# Patient Record
Sex: FEMALE | NOT HISPANIC OR LATINO | Employment: FULL TIME | URBAN - METROPOLITAN AREA
[De-identification: names, ages, dates, MRNs, and addresses within clinical notes are randomized per-mention and may not be internally consistent; named-entity substitution may affect disease eponyms.]

---

## 2017-02-17 ENCOUNTER — HOSPITAL ENCOUNTER (OUTPATIENT)
Dept: MAMMOGRAPHY | Facility: CLINIC | Age: 52
Discharge: HOME | End: 2017-02-17
Attending: FAMILY MEDICINE | Admitting: FAMILY MEDICINE
Payer: COMMERCIAL

## 2017-02-17 DIAGNOSIS — Z12.31 VISIT FOR SCREENING MAMMOGRAM: ICD-10-CM

## 2017-02-17 PROCEDURE — 77063 BREAST TOMOSYNTHESIS BI: CPT

## 2017-02-17 PROCEDURE — G0202 SCR MAMMO BI INCL CAD: HCPCS

## 2017-02-23 ENCOUNTER — HOSPITAL ENCOUNTER (OUTPATIENT)
Dept: MAMMOGRAPHY | Facility: CLINIC | Age: 52
Discharge: HOME | End: 2017-02-23
Attending: FAMILY MEDICINE | Admitting: FAMILY MEDICINE
Payer: COMMERCIAL

## 2017-02-23 DIAGNOSIS — R92.8 ABNORMAL MAMMOGRAM: ICD-10-CM

## 2017-02-23 PROCEDURE — 76642 ULTRASOUND BREAST LIMITED: CPT | Mod: LT

## 2017-08-12 ENCOUNTER — HEALTH MAINTENANCE LETTER (OUTPATIENT)
Age: 52
End: 2017-08-12

## 2017-08-31 ENCOUNTER — HOSPITAL ENCOUNTER (OUTPATIENT)
Dept: MAMMOGRAPHY | Facility: CLINIC | Age: 52
Discharge: HOME | End: 2017-08-31
Attending: FAMILY MEDICINE | Admitting: FAMILY MEDICINE
Payer: COMMERCIAL

## 2017-08-31 DIAGNOSIS — Z09 FOLLOW-UP EXAM, 3-6 MONTHS SINCE PREVIOUS EXAM: ICD-10-CM

## 2017-08-31 PROCEDURE — G0279 TOMOSYNTHESIS, MAMMO: HCPCS

## 2018-04-13 ENCOUNTER — HOSPITAL ENCOUNTER (OUTPATIENT)
Dept: MAMMOGRAPHY | Facility: CLINIC | Age: 53
Discharge: HOME | End: 2018-04-13
Attending: FAMILY MEDICINE | Admitting: FAMILY MEDICINE
Payer: COMMERCIAL

## 2018-04-13 DIAGNOSIS — Z12.31 VISIT FOR SCREENING MAMMOGRAM: ICD-10-CM

## 2018-04-13 PROCEDURE — 77063 BREAST TOMOSYNTHESIS BI: CPT

## 2018-07-29 ENCOUNTER — APPOINTMENT (OUTPATIENT)
Dept: ULTRASOUND IMAGING | Facility: CLINIC | Age: 53
DRG: 301 | End: 2018-07-29
Attending: EMERGENCY MEDICINE
Payer: COMMERCIAL

## 2018-07-29 ENCOUNTER — HOSPITAL ENCOUNTER (INPATIENT)
Facility: CLINIC | Age: 53
LOS: 2 days | Discharge: HOME | DRG: 301 | End: 2018-07-31
Attending: EMERGENCY MEDICINE | Admitting: SURGERY
Payer: COMMERCIAL

## 2018-07-29 DIAGNOSIS — M79.605 PAIN OF LEFT LOWER EXTREMITY: ICD-10-CM

## 2018-07-29 DIAGNOSIS — I82.4Z2 DVT, LOWER EXTREMITY, DISTAL, ACUTE, LEFT (H): Primary | ICD-10-CM

## 2018-07-29 DIAGNOSIS — I82.409 DEEP VEIN THROMBOSIS (DVT) (H): ICD-10-CM

## 2018-07-29 LAB
ANION GAP SERPL CALCULATED.3IONS-SCNC: 10 MMOL/L (ref 3–14)
BASOPHILS # BLD AUTO: 0 10E9/L (ref 0–0.2)
BASOPHILS NFR BLD AUTO: 0.3 %
BUN SERPL-MCNC: 20 MG/DL (ref 7–30)
CALCIUM SERPL-MCNC: 8.6 MG/DL (ref 8.5–10.1)
CHLORIDE SERPL-SCNC: 105 MMOL/L (ref 94–109)
CO2 SERPL-SCNC: 25 MMOL/L (ref 20–32)
CREAT SERPL-MCNC: 0.7 MG/DL (ref 0.52–1.04)
DIFFERENTIAL METHOD BLD: ABNORMAL
EOSINOPHIL # BLD AUTO: 0.2 10E9/L (ref 0–0.7)
EOSINOPHIL NFR BLD AUTO: 1.2 %
ERYTHROCYTE [DISTWIDTH] IN BLOOD BY AUTOMATED COUNT: 14 % (ref 10–15)
GFR SERPL CREATININE-BSD FRML MDRD: 88 ML/MIN/1.7M2
GLUCOSE SERPL-MCNC: 187 MG/DL (ref 70–99)
HCT VFR BLD AUTO: 40.3 % (ref 35–47)
HGB BLD-MCNC: 13.5 G/DL (ref 11.7–15.7)
IMM GRANULOCYTES # BLD: 0 10E9/L (ref 0–0.4)
IMM GRANULOCYTES NFR BLD: 0.3 %
LYMPHOCYTES # BLD AUTO: 1.9 10E9/L (ref 0.8–5.3)
LYMPHOCYTES NFR BLD AUTO: 15 %
MCH RBC QN AUTO: 29 PG (ref 26.5–33)
MCHC RBC AUTO-ENTMCNC: 33.5 G/DL (ref 31.5–36.5)
MCV RBC AUTO: 87 FL (ref 78–100)
MONOCYTES # BLD AUTO: 0.6 10E9/L (ref 0–1.3)
MONOCYTES NFR BLD AUTO: 4.7 %
NEUTROPHILS # BLD AUTO: 9.7 10E9/L (ref 1.6–8.3)
NEUTROPHILS NFR BLD AUTO: 78.5 %
NRBC # BLD AUTO: 0 10*3/UL
NRBC BLD AUTO-RTO: 0 /100
PLATELET # BLD AUTO: 298 10E9/L (ref 150–450)
POTASSIUM SERPL-SCNC: 3.4 MMOL/L (ref 3.4–5.3)
RBC # BLD AUTO: 4.65 10E12/L (ref 3.8–5.2)
SODIUM SERPL-SCNC: 140 MMOL/L (ref 133–144)
WBC # BLD AUTO: 12.3 10E9/L (ref 4–11)

## 2018-07-29 PROCEDURE — 96366 THER/PROPH/DIAG IV INF ADDON: CPT

## 2018-07-29 PROCEDURE — 80048 BASIC METABOLIC PNL TOTAL CA: CPT | Performed by: EMERGENCY MEDICINE

## 2018-07-29 PROCEDURE — 25000128 H RX IP 250 OP 636

## 2018-07-29 PROCEDURE — 96365 THER/PROPH/DIAG IV INF INIT: CPT

## 2018-07-29 PROCEDURE — 99285 EMERGENCY DEPT VISIT HI MDM: CPT | Mod: 25

## 2018-07-29 PROCEDURE — 83036 HEMOGLOBIN GLYCOSYLATED A1C: CPT | Performed by: EMERGENCY MEDICINE

## 2018-07-29 PROCEDURE — 96376 TX/PRO/DX INJ SAME DRUG ADON: CPT

## 2018-07-29 PROCEDURE — 85025 COMPLETE CBC W/AUTO DIFF WBC: CPT | Performed by: EMERGENCY MEDICINE

## 2018-07-29 PROCEDURE — 25000132 ZZH RX MED GY IP 250 OP 250 PS 637: Performed by: STUDENT IN AN ORGANIZED HEALTH CARE EDUCATION/TRAINING PROGRAM

## 2018-07-29 PROCEDURE — 93971 EXTREMITY STUDY: CPT | Mod: LT

## 2018-07-29 PROCEDURE — 25000128 H RX IP 250 OP 636: Performed by: STUDENT IN AN ORGANIZED HEALTH CARE EDUCATION/TRAINING PROGRAM

## 2018-07-29 PROCEDURE — 12000007 ZZH R&B INTERMEDIATE

## 2018-07-29 RX ORDER — TRAMADOL HYDROCHLORIDE 50 MG/1
50-100 TABLET ORAL EVERY 6 HOURS PRN
Status: DISCONTINUED | OUTPATIENT
Start: 2018-07-29 | End: 2018-07-31 | Stop reason: HOSPADM

## 2018-07-29 RX ORDER — LIDOCAINE 40 MG/G
CREAM TOPICAL
Status: DISCONTINUED | OUTPATIENT
Start: 2018-07-29 | End: 2018-07-31 | Stop reason: HOSPADM

## 2018-07-29 RX ORDER — AMOXICILLIN 250 MG
2 CAPSULE ORAL DAILY PRN
COMMUNITY
End: 2018-08-15

## 2018-07-29 RX ORDER — ACETAMINOPHEN 325 MG/1
975 TABLET ORAL EVERY 8 HOURS
Status: DISCONTINUED | OUTPATIENT
Start: 2018-07-29 | End: 2018-07-31 | Stop reason: HOSPADM

## 2018-07-29 RX ORDER — HYDRALAZINE HYDROCHLORIDE 20 MG/ML
10 INJECTION INTRAMUSCULAR; INTRAVENOUS EVERY 4 HOURS PRN
Status: DISCONTINUED | OUTPATIENT
Start: 2018-07-29 | End: 2018-07-31 | Stop reason: HOSPADM

## 2018-07-29 RX ORDER — NALOXONE HYDROCHLORIDE 0.4 MG/ML
.1-.4 INJECTION, SOLUTION INTRAMUSCULAR; INTRAVENOUS; SUBCUTANEOUS
Status: DISCONTINUED | OUTPATIENT
Start: 2018-07-29 | End: 2018-07-31 | Stop reason: HOSPADM

## 2018-07-29 RX ORDER — METHOCARBAMOL 750 MG/1
750 TABLET, FILM COATED ORAL 4 TIMES DAILY PRN
Status: DISCONTINUED | OUTPATIENT
Start: 2018-07-29 | End: 2018-07-31 | Stop reason: HOSPADM

## 2018-07-29 RX ORDER — ONDANSETRON 2 MG/ML
4 INJECTION INTRAMUSCULAR; INTRAVENOUS EVERY 6 HOURS PRN
Status: DISCONTINUED | OUTPATIENT
Start: 2018-07-29 | End: 2018-07-31 | Stop reason: HOSPADM

## 2018-07-29 RX ORDER — ONDANSETRON 4 MG/1
4 TABLET, ORALLY DISINTEGRATING ORAL EVERY 6 HOURS PRN
Status: DISCONTINUED | OUTPATIENT
Start: 2018-07-29 | End: 2018-07-31 | Stop reason: HOSPADM

## 2018-07-29 RX ORDER — ANTIARTHRITIC COMBINATION NO.2 900 MG
12.5 TABLET ORAL DAILY
Status: ON HOLD | COMMUNITY
End: 2018-07-31

## 2018-07-29 RX ORDER — ACETAMINOPHEN 325 MG/1
650 TABLET ORAL EVERY 4 HOURS PRN
Status: DISCONTINUED | OUTPATIENT
Start: 2018-08-01 | End: 2018-07-31 | Stop reason: HOSPADM

## 2018-07-29 RX ORDER — LABETALOL HYDROCHLORIDE 5 MG/ML
10 INJECTION, SOLUTION INTRAVENOUS EVERY 4 HOURS PRN
Status: DISCONTINUED | OUTPATIENT
Start: 2018-07-29 | End: 2018-07-31 | Stop reason: HOSPADM

## 2018-07-29 RX ORDER — DIPHENHYDRAMINE HCL 25 MG
25 CAPSULE ORAL EVERY 6 HOURS PRN
Status: DISCONTINUED | OUTPATIENT
Start: 2018-07-29 | End: 2018-07-31 | Stop reason: HOSPADM

## 2018-07-29 RX ORDER — DIPHENHYDRAMINE HYDROCHLORIDE 50 MG/ML
25 INJECTION INTRAMUSCULAR; INTRAVENOUS EVERY 6 HOURS PRN
Status: DISCONTINUED | OUTPATIENT
Start: 2018-07-29 | End: 2018-07-31 | Stop reason: HOSPADM

## 2018-07-29 RX ORDER — SODIUM CHLORIDE 9 MG/ML
INJECTION, SOLUTION INTRAVENOUS CONTINUOUS
Status: DISCONTINUED | OUTPATIENT
Start: 2018-07-29 | End: 2018-07-30

## 2018-07-29 RX ORDER — SODIUM CHLORIDE, SODIUM LACTATE, POTASSIUM CHLORIDE, CALCIUM CHLORIDE 600; 310; 30; 20 MG/100ML; MG/100ML; MG/100ML; MG/100ML
INJECTION, SOLUTION INTRAVENOUS CONTINUOUS
Status: DISCONTINUED | OUTPATIENT
Start: 2018-07-29 | End: 2018-07-30 | Stop reason: CLARIF

## 2018-07-29 RX ADMIN — HEPARIN SODIUM 1150 UNITS/HR: 10000 INJECTION, SOLUTION INTRAVENOUS at 20:31

## 2018-07-29 RX ADMIN — ACETAMINOPHEN 975 MG: 325 TABLET, FILM COATED ORAL at 23:41

## 2018-07-29 RX ADMIN — Medication 5200 UNITS: at 20:31

## 2018-07-29 RX ADMIN — TRAMADOL HYDROCHLORIDE 50 MG: 50 TABLET, COATED ORAL at 23:41

## 2018-07-29 ASSESSMENT — ENCOUNTER SYMPTOMS
ABDOMINAL PAIN: 0
SHORTNESS OF BREATH: 0
LIGHT-HEADEDNESS: 1
COLOR CHANGE: 1

## 2018-07-29 NOTE — IP AVS SNAPSHOT
[facility]    [address] [place] [address]    Phone:  [phone]                                       After Visit Summary   7/29/2018    Louann Claire    MRN: [id number]           After Visit Summary Signature Page     I have received my discharge instructions, and my questions have been answered. I have discussed any challenges I see with this plan with the nurse or doctor.    ..........................................................................................................................................  Patient/Patient Representative Signature      ..........................................................................................................................................  Patient Representative Print Name and Relationship to Patient    ..................................................               ................................................  Date                                            Time    ..........................................................................................................................................  Reviewed by Signature/Title    ...................................................              ..............................................  Date                                                            Time

## 2018-07-29 NOTE — IP AVS SNAPSHOT
MRN:[id number]                      After Visit Summary   7/29/2018    Louann Claire    MRN: [id number]           Thank you!     Thank you for choosing [facility] for your care. Our goal is always to provide you with excellent care. Hearing back from our patients is one way we can continue to improve our services. Please take a few minutes to complete the written survey that you may receive in the mail after you visit with us. Thank you!        Patient Information     Date Of Birth          1965        Designated Caregiver       Most Recent Value    Caregiver    Will someone help with your care after discharge? yes    Name of designated caregiver mateo    Phone number of caregiver [phone]    Caregiver address [address]      About your hospital stay     You were admitted on:  July 29, 2018 You last received care in the:  [facility]    You were discharged on:  July 31, 2018        Reason for your hospital stay       Left lower extremity DVT.                  Who to Call     For medical emergencies, please call 911.  For non-urgent questions about your medical care, please call your primary care provider or clinic, [phone]          Attending Provider     Provider Specialty    Eben Zaidi,  Emergency Medicine    Alan Tineo MD Surgery       Primary Care Provider Office Phone # Fax #    Eve Stevenson [phone]      After Care Instructions     Activity       Your activity upon discharge: Per Orthopedic Surgery            Diet       Follow this diet upon discharge:  Regular Diet Adult            Discharge Instructions       You should take Xarelto 15 mg twice daily (with breakfast and supper - must be taken with food) for 21 days. After you complete 15 mg tablets, you will switch to 20 mg tablets only once daily with supper. Dr. Navarrete will prescribe these tablets to you during follow-up.  "    If you have any questions or concerns regarding your anticoagulation or clot, please contact Dr. Navarrete at [phone].     Elevate leg as much as possible with blue wedge pillow.     Once cleared by Orthopedic Surgery, wear compression stockings (20-30 mmHg thigh high).                  Follow-up Appointments     Follow-up and recommended labs and tests        Follow up with Dr. Navarrete of Vascular Medicine in 2 weeks at the [facility]. Please call [phone] to make an appointment.     Follow up with OB/GYN or Endocrinology regarding your hormone replacement therapy. You should ideally remain off of these for now given your DVT.                  Pending Results     Date and Time Order Name Status Description    7/30/2018 1656 XR Ankle Left G/E 3 Views In process             Statement of Approval     Ordered          07/31/18 1125  I have reviewed and agree with all the recommendations and orders detailed in this document.  EFFECTIVE NOW     Approved and electronically signed by:  Estefania Woody PA-C             Admission Information     Date & Time Provider Department Dept. Phone    7/29/2018 Alan Tineo MD [facility] [facility] [phone]      Your Vitals Were     Blood Pressure Pulse Temperature Respirations Height Weight    123/77 81 97.6  F (36.4  C) (Oral) 16 1.626 m (5' 4\") 79.4 kg (175 lb)    Pulse Oximetry BMI (Body Mass Index)                94% 30.04 kg/m2          MyChart Information     [employer] lets you send messages to your doctor, view your test results, renew your prescriptions, schedule appointments and more. To sign up, go to [email or web address] . Click on \"Log in\" on the left side of the screen, which will take you to the Welcome page. Then click on \"Sign up Now\" on the right side of the page.     You will be asked to enter the access code listed below, as well as some personal information. Please " follow the directions to create your username and password.     Your access code is: [email or web address]  Expires: 10/29/2018  2:58 PM     Your access code will  in 90 days. If you need help or a new code, please call your [facility] clinic or [phone].        Care EveryWhere ID     This is your Care EveryWhere ID. This could be used by other organizations to access your [facility] medical records  [id number]        Equal Access to Services     BERE ALAMO : Hadii aad ku hadasho Soomaali, waaxda luqadaha, qaybta kaalmada adeegyada, waxay sajiin hayjessica de los santos . So [facility] [phone].    ATENCIÓN: Si habla español, tiene a webster disposición servicios gratuitos de asistencia lingüística. Llame al [phone].    We comply with applicable federal civil rights laws and [place] laws. We do not discriminate on the basis of race, color, national origin, age, disability, sex, sexual orientation, or gender identity.               Review of your medicines      START taking        Dose / Directions    rivaroxaban ANTICOAGULANT 15 MG Tabs tablet   Commonly known as:  XARELTO        Dose:  15 mg   Take 1 tablet (15 mg) by mouth 2 times daily (with meals)   Quantity:  41 tablet   Refills:  0         CONTINUE these medicines which have NOT CHANGED        Dose / Directions    ACETAMINOPHEN PO        Dose:  1000 mg   Take 1,000 mg by mouth every 8 hours as needed for pain   Refills:  0       ESCITALOPRAM OXALATE PO        Dose:  10 mg   Take 10 mg by mouth daily   Refills:  0       HYDROXYZINE PAMOATE PO        Dose:  25 mg   Take 25 mg by mouth every 6 hours as needed for itching   Refills:  0       LEVOTHYROXINE SODIUM PO        Dose:  100 mcg   Take 100 mcg by mouth daily   Refills:  0       OXYCODONE HCL PO        Dose:  5-10 mg   Take 5-10 mg by mouth every 3 hours as needed   Refills:  0       senna-docusate 8.6-50 MG per tablet   Commonly known as:  SENOKOT-S;PERICOLACE        Dose:  2 tablet   Take 2 tablets by  mouth daily as needed for constipation   Refills:  0       VITAMIN C PO        Dose:  500 mg   Take 500 mg by mouth daily   Refills:  0       VITAMIN D (CHOLECALCIFEROL) PO        Dose:  5000 Units   Take 5,000 Units by mouth daily   Refills:  0         STOP taking     ASPIRIN EC PO           dhea 25 MG Tabs           PROGESTERONE MICRONIZED PO                Where to get your medicines      These medications were sent to [facility] [place], [place] - [address]  [address], [place] [address]     Phone:  [phone]     rivaroxaban ANTICOAGULANT 15 MG Tabs tablet                Protect others around you: Learn how to safely use, store and throw away your medicines at www.disposemymeds.org.        Information about OPIOIDS     PRESCRIPTION OPIOIDS: WHAT YOU NEED TO KNOW   We gave you an opioid (narcotic) pain medicine. It is important to manage your pain, but opioids are not always the best choice. You should first try all the other options your care team gave you. Take this medicine for as short a time (and as few doses) as possible.     These medicines have risks:    DO NOT drive when on new or higher doses of pain medicine. These medicines can affect your alertness and reaction times, and you could be arrested for driving under the influence (DUI). If you need to use opioids long-term, talk to your care team about driving.    DO NOT operate heave machinery    DO NOT do any other dangerous activities while taking these medicines.     DO NOT drink any alcohol while taking these medicines.      If the opioid prescribed includes acetaminophen, DO NOT take with any other medicines that contain acetaminophen. Read all labels carefully. Look for the word  acetaminophen  or  Tylenol.  Ask your pharmacist if you have questions or are unsure.    You can get addicted to pain medicines, especially if you have a history of addiction (chemical, alcohol or substance dependence). Talk to your care  team about ways to reduce this risk.    Store your pills in a secure place, locked if possible. We will not replace any lost or stolen medicine. If you don t finish your medicine, please throw away (dispose) as directed by your pharmacist. The [place] Pollution Control Agency has more information about safe disposal: https://www.pca.[facility].[place].us/living-green/managing-unwanted-medications.     All opioids tend to cause constipation. Drink plenty of water and eat foods that have a lot of fiber, such as fruits, vegetables, prune juice, apple juice and high-fiber cereal. Take a laxative (Miralax, milk of magnesia, Colace, Senna) if you don t move your bowels at least every other day.              Medication List: This is a list of all your medications and when to take them. Check marks below indicate your daily home schedule. Keep this list as a reference.      Medications           Morning Afternoon Evening Bedtime As Needed    ACETAMINOPHEN PO   Take 1,000 mg by mouth every 8 hours as needed for pain   Last time this was given:  975 mg on 7/31/2018  2:38 PM                                   ESCITALOPRAM OXALATE PO   Take 10 mg by mouth daily   Last time this was given:  10 mg on 7/31/2018 10:08 AM                                   HYDROXYZINE PAMOATE PO   Take 25 mg by mouth every 6 hours as needed for itching                                   LEVOTHYROXINE SODIUM PO   Take 100 mcg by mouth daily   Last time this was given:  100 mcg on 7/31/2018 10:08 AM                                   OXYCODONE HCL PO   Take 5-10 mg by mouth every 3 hours as needed                                   rivaroxaban ANTICOAGULANT 15 MG Tabs tablet   Commonly known as:  XARELTO   Take 1 tablet (15 mg) by mouth 2 times daily (with meals)   Last time this was given:  15 mg on 7/31/2018 12:49 PM                                      senna-docusate 8.6-50 MG per tablet   Commonly known as:  SENOKOT-S;PERICOLACE   Take 2 tablets by mouth daily  as needed for constipation                                   VITAMIN C PO   Take 500 mg by mouth daily                                   VITAMIN D (CHOLECALCIFEROL) PO   Take 5,000 Units by mouth daily                                             More Information        Discharge Instructions for Deep Vein Thrombosis (DVT)  You have been diagnosed with deep vein thrombosis (DVT). You have a blood clot in a deep vein. Hospital and home treatment for DVT include medications to keep the clot from growing. Here are guidelines for home care and lifestyle changes to reduce your risk of future blood clots.  Home Care    Take your medications exactly as directed. Don t skip doses. Your medications will thin your blood and help prevent new clots.    Keep your appointments for lab tests. It is important for your doctor to monitor your INR (International Normalized Ratio) on a regular basis. This is a simple blood test.    Avoid sitting, standing, or lying down for long periods without moving your legs and feet.  ? When traveling by car, make frequent stops to get out and move around.  ? On long airplane, train, or bus rides, get up and move around when possible.  ? If you can t get up, wiggle your toes and tighten your calves to keep your blood moving.    Wear compression stockings as directed by your doctor.    Elevate your legs whenever they feel swollen or heavy.  Lifestyle Changes    Begin an exercise program. Ask your doctor how to get started. You can benefit from simple activities such as walking or gardening.    Maintain a healthy weight.     Break the smoking habit. Enroll in a stop-smoking program to improve your chances of success.  Follow-Up  Make a follow-up appointment with a hematologist (doctor who specializes in the blood). He or she will decide how long you should stay on blood thinners.  When to Call Your Doctor  Call your doctor immediately if you have any of the following:    Swelling or pain in your leg  (often in just one leg)    Sudden, continuous pain deep in a muscle    Pain that worsens when you are active or when you stand still for a long time    Chest pain    Sudden shortness of breath    Cough with blood or bloody sputum    Bruises    Heavy or uncontrolled bleeding    Blood in your urine, stool, or vomit    Black or tarry stools     5127-2856 [employer]. [address] [place] [address]. All rights reserved. This information is not intended as a substitute for professional medical care. Always follow your healthcare professional's instructions.  This information has been modified by your health care provider with permission from the publisher.                Leg Swelling in a Single Leg  Swelling of the arms, feet, ankles, and legs is called edema. It is caused by extra fluid collecting in the tissues. Because of gravity, extra fluid in the body settles to the lowest part. That is why the legs and feet are most affected. You have swelling in a single leg.  Some of the causes for swelling in only a single leg include:    Infection in the foot or leg    Long-term problem with a vein not working well (venous insufficiency)    Swollen, twisted vein in the leg (varicose veins)    Insect bite or sting on the foot or leg    Injury or recent surgery on the foot or leg    Blood clot in a deep vein of the leg (deep vein thrombosis or DVT)    Inflammation of the joints of the lower leg  Medical treatment will depend on what is causing your swelling.  Home care  Follow these guidelines when caring for yourself at home:    Don t wear tight clothing.    Keep your legs up while lying or sitting.    Take any medicines as directed.    If infection, injury, or recent surgery is the cause of your swelling, stay off your legs as much as possible until your symptoms get better.    If you have venous insufficiency or varicose veins, don t sit or  one place for long periods of time. Take breaks and  walk around every few hours. Talk with your healthcare provider about wearing support stockings to help lessen swelling during the day.    Wear compression stockings with your doctor's approval  Follow-up care  Follow up with your healthcare provider as advised.  Call 911  Call 911 if any of these occur:    Shortness of breath or trouble breathing    Chest pain    Coughing up blood    Fainting or loss of consciousness   When to seek medical advice  Call your healthcare provider right away if any of these occur:    Increased pain, swelling, warmth, or redness of the leg, ankle, or foot    Fever of 100.4 F (38 C) or higher, or as directed by your healthcare provider    Weakness or dizziness    Shaking chills    Drenching sweats  Date Last Reviewed: 4/11/2016 2000-2017 [employer]. [address] [place] [address], [place] [address]. All rights reserved. This information is not intended as a substitute for professional medical care. Always follow your healthcare professional's instructions.

## 2018-07-29 NOTE — ED PROVIDER NOTES
"  History     Chief Complaint:  Post-op problem    HPI   Louann Claire is a 52 year old female, on aspirin, who presents with a post-op problem. She had surgery with Dr. Hebert of [facility]opedics on 7/19 for a fibula fracture. This morning she noticed some discoloration to her left leg, as well as some swelling. Then she noticed her toes and lower leg suddenly were turning purple. She is able to move her toes, and has increasing moderate pain to her lower extremity. She went to urgent care this afternoon for evaluation of discoloration to her left leg, and pain described as a \"muscular cramping\" to her posterior LLE. She did feel lightheaded, and was having hot flashes. Denies shortness of breath. She has 4/10 pain at the location of her surgical incision, and no pain anywhere else. She presented was sent here to the ER for further evaluation of the color change.     Cardiac/PE/DVT Risk Factors:  History of hypertension - No  History of hyperlipidemia - No  History of diabetes - No  History of smoking - No  Personal history of PE/DVT - No  Family history of PE/DVT - No  Family history of heart complications - No  Recent travel - No  Recent surgery - Yes  Other immobilizations - No  Cancer - No    Allergies:  Amoxicillin  Sulfa drugs     Medications:    Lexapro  Levothyroxine    Past Medical History:    Anxiety  Thyroid disease    Past Surgical History:    Dilation and curettage  Orthopedic surgery - fibula surgery    Family History:    No past pertinent family history.    Social History:  Relationship status:   Tobacco use: No  Alcohol use: Yes, occasionally.   The patient presents with her .    Marital Status:   [2]     Review of Systems   Respiratory: Negative for shortness of breath.    Cardiovascular: Positive for leg swelling. Negative for chest pain.   Gastrointestinal: Negative for abdominal pain.   Skin: Positive for color change.   Neurological: Positive for " "light-headedness.   All other systems reviewed and are negative.    Physical Exam   Vitals:  Patient Vitals for the past 24 hrs:   BP Temp Temp src Pulse Resp SpO2 Height Weight   07/29/18 2215 117/79 99  F (37.2  C) Oral 81 16 97 % - -   07/29/18 2151 - - - - - 96 % - -   07/29/18 2145 - - - - - 96 % - -   07/29/18 2136 - - - - - 95 % - -   07/29/18 2135 133/89 - - - - - - -   07/29/18 1750 112/78 98.1  F (36.7  C) Oral 91 16 98 % 1.626 m (5' 4\") 79.4 kg (175 lb)       Physical Exam     General: Alert and cooperative with exam. Patient in moderate distress. Normal mentation.  Head:  Scalp is NC/AT  Eyes:  No scleral icterus, PERRL  ENT:  The external nose and ears are normal. The oropharynx is normal and without erythema; mucus membranes are moist. Uvula midline, no evidence of deep space infection.  Neck:  Normal range of motion without rigidity.  CV:  Regular rate and rhythm    No pathologic murmur   Resp:  Breath sounds are clear bilaterally    Non-labored, no retractions or accessory muscle use  GI:  Abdomen is soft, no distension, no tenderness. No peritoneal signs  MS:  LLE: CMS intact (pulses dopplerable), surgical incision present to lateral/anterior ankle with sutures in place C/D/I. Compartments soft and compressible. Asymmetric swelling with discoloration as seen below.   Skin:  Warm and dry, No rash or lesions noted.  Neuro: Oriented x 3. No gross motor deficits.           Emergency Department Course     Imaging:  Radiology findings were communicated with the patient who voiced understanding of the findings.  US Lower Extremity Venous Duplex Left   IMPRESSION:  Extensive DVT throughout the entire left lower extremity.  Reading per radiology.     Laboratory:  Laboratory findings were communicated with the patient who voiced understanding of the findings.  CBC: WBC: 12.3(H), Abs neutrophil: 9.7(H) o/w WNL (HGB 13.5, )  BMP: Glucose: 187(H) o/w WNL (Creatinine 0.70)    Interventions:  2031 Heparin " drip 1,150 units/hr IV  2031 Heparin loading dose 5,200 units IV     Emergency Department Course:  Nursing notes and vitals reviewed.  I performed an exam of the patient as documented above.   IV was inserted and blood was drawn for laboratory testing, results above.  The patient was sent for a US while in the emergency department, results above.      1940 I spoke with Dr. Quintana of vascular surgery.  1947 I updated the patient.  I spoke with Dr. Tineo regarding the patient.    I discussed the treatment plan with the patient. They expressed understanding of this plan and consented to admission. I discussed the patient with Dr. Tineo, who will admit the patient to a monitored bed for further evaluation and treatment.      I personally reviewed the laboratory results with the patient and answered all related questions prior to admission.    Impression & Plan      Medical Decision Making:  Patient is a 52-year-old female who presents with left lower extremity pain and color change; a history of recent orthopedic surgery and immobilization.  Patient's medical history and records were reviewed.  Initial consideration for, not limited to, infectious process, DVT, or other vascular pathology, among others.  Labs obtained and notable only for mild leukocytosis.  Left lower extremity ultrasound showed extensive occlusive DVT.  Patient's sensory/motor function remained intact. Pulses were dopplerable in her left lower extremity. She reported moderate pain but deferred pain medication while in the ED.  Given significant DVT and skin discoloration did consult vascular surgery.  Patient was placed on a heparin infusion.  Vascular surgery recommended admission for further evaluation and care.  Admitted to Dr. Tineo after evaluation by the vascular fellow Dr. Quintana.  Patient reports no shortness of breath and does not demonstrate any tachycardia or hypoxia to suggest a PE at this time.  She remained stable throughout ED course.  Of note  patient's lower extremity splint was removed at urgent care prior to ED evaluation; splint was not replaced given need for continued monitoring; patient's weightbearing status should be discussed with orthopedics prior to discharge.    Diagnosis:    ICD-10-CM    1. Deep vein thrombosis (DVT) (H) I82.409    2. Pain of left lower extremity M79.605      Disposition:   Admitted     Scribe Disclosure:  Alexandra VICKERS, am serving as a scribe at 6:08 PM on 7/29/2018 to document services personally performed by Eben Zaidi DO, based on my observations and the provider's statements to me.     EMERGENCY DEPARTMENT       Eben Zaidi DO  07/29/18 5375

## 2018-07-30 ENCOUNTER — APPOINTMENT (OUTPATIENT)
Dept: GENERAL RADIOLOGY | Facility: CLINIC | Age: 53
DRG: 301 | End: 2018-07-30
Attending: PHYSICIAN ASSISTANT
Payer: COMMERCIAL

## 2018-07-30 LAB
ERYTHROCYTE [DISTWIDTH] IN BLOOD BY AUTOMATED COUNT: 14.1 % (ref 10–15)
HBA1C MFR BLD: 6 % (ref 0–5.6)
HCT VFR BLD AUTO: 35.8 % (ref 35–47)
HGB BLD-MCNC: 11.8 G/DL (ref 11.7–15.7)
LMWH PPP CHRO-ACNC: 0.48 IU/ML
LMWH PPP CHRO-ACNC: 0.57 IU/ML
LMWH PPP CHRO-ACNC: 0.8 IU/ML
MCH RBC QN AUTO: 28.2 PG (ref 26.5–33)
MCHC RBC AUTO-ENTMCNC: 33 G/DL (ref 31.5–36.5)
MCV RBC AUTO: 86 FL (ref 78–100)
PLATELET # BLD AUTO: 285 10E9/L (ref 150–450)
RBC # BLD AUTO: 4.18 10E12/L (ref 3.8–5.2)
WBC # BLD AUTO: 9.8 10E9/L (ref 4–11)

## 2018-07-30 PROCEDURE — 25000132 ZZH RX MED GY IP 250 OP 250 PS 637: Performed by: STUDENT IN AN ORGANIZED HEALTH CARE EDUCATION/TRAINING PROGRAM

## 2018-07-30 PROCEDURE — 85520 HEPARIN ASSAY: CPT | Performed by: SURGERY

## 2018-07-30 PROCEDURE — 36415 COLL VENOUS BLD VENIPUNCTURE: CPT | Performed by: SURGERY

## 2018-07-30 PROCEDURE — 12000007 ZZH R&B INTERMEDIATE

## 2018-07-30 PROCEDURE — 40000986 XR ANKLE LT G/E 3 VW: Mod: LT

## 2018-07-30 PROCEDURE — 25000128 H RX IP 250 OP 636

## 2018-07-30 PROCEDURE — 36415 COLL VENOUS BLD VENIPUNCTURE: CPT | Performed by: STUDENT IN AN ORGANIZED HEALTH CARE EDUCATION/TRAINING PROGRAM

## 2018-07-30 PROCEDURE — 99222 1ST HOSP IP/OBS MODERATE 55: CPT | Performed by: SURGERY

## 2018-07-30 PROCEDURE — 85027 COMPLETE CBC AUTOMATED: CPT | Performed by: STUDENT IN AN ORGANIZED HEALTH CARE EDUCATION/TRAINING PROGRAM

## 2018-07-30 PROCEDURE — 99223 1ST HOSP IP/OBS HIGH 75: CPT | Performed by: INTERNAL MEDICINE

## 2018-07-30 PROCEDURE — 85520 HEPARIN ASSAY: CPT | Performed by: STUDENT IN AN ORGANIZED HEALTH CARE EDUCATION/TRAINING PROGRAM

## 2018-07-30 RX ORDER — ESCITALOPRAM OXALATE 10 MG/1
10 TABLET ORAL DAILY
Status: DISCONTINUED | OUTPATIENT
Start: 2018-07-30 | End: 2018-07-31 | Stop reason: HOSPADM

## 2018-07-30 RX ORDER — LEVOTHYROXINE SODIUM 100 UG/1
100 TABLET ORAL DAILY
Status: DISCONTINUED | OUTPATIENT
Start: 2018-07-30 | End: 2018-07-31 | Stop reason: HOSPADM

## 2018-07-30 RX ADMIN — HEPARIN SODIUM 1050 UNITS/HR: 10000 INJECTION, SOLUTION INTRAVENOUS at 13:35

## 2018-07-30 RX ADMIN — ESCITALOPRAM 10 MG: 10 TABLET, FILM COATED ORAL at 09:16

## 2018-07-30 RX ADMIN — TRAMADOL HYDROCHLORIDE 50 MG: 50 TABLET, COATED ORAL at 19:58

## 2018-07-30 RX ADMIN — ACETAMINOPHEN 975 MG: 325 TABLET, FILM COATED ORAL at 13:35

## 2018-07-30 RX ADMIN — ACETAMINOPHEN 975 MG: 325 TABLET, FILM COATED ORAL at 21:36

## 2018-07-30 RX ADMIN — LEVOTHYROXINE SODIUM 100 MCG: 100 TABLET ORAL at 09:16

## 2018-07-30 RX ADMIN — ACETAMINOPHEN 975 MG: 325 TABLET, FILM COATED ORAL at 06:30

## 2018-07-30 ASSESSMENT — ACTIVITIES OF DAILY LIVING (ADL)
ADLS_ACUITY_SCORE: 28

## 2018-07-30 NOTE — PROGRESS NOTES
RECEIVING UNIT ED HANDOFF REVIEW    ED Nurse Handoff Report was reviewed by: CHAVEZ INIGUEZ on July 29, 2018 at 10:04 PM

## 2018-07-30 NOTE — PHARMACY-ADMISSION MEDICATION HISTORY
Admission medication history interview status for the 7/29/2018  admission is complete. See EPIC admission navigator for prior to admission medications     Medication history source reliability:Good    Actions taken by pharmacist (provider contacted, etc): called pharmacy to verify doses for aspirin, hydroxyzine, oxycodone, senna-docusate, and progesterone.     Additional medication history information not noted on PTA med list :None    Medications added: all below - initial PTA med list was blank.    Medication reconciliation/reorder completed by provider prior to medication history? No    Time spent in this activity: 20 minutes    Prior to Admission medications    Medication Sig Last Dose Taking? Auth Provider   ACETAMINOPHEN PO Take 1,000 mg by mouth every 8 hours as needed for pain  at prn Yes Unknown, Entered By History   Ascorbic Acid (VITAMIN C PO) Take 500 mg by mouth daily 7/29/2018 at Unknown time Yes Unknown, Entered By History   ASPIRIN EC PO Take 325 mg by mouth daily  7/29/2018 at Unknown time Yes Unknown, Entered By History   dhea 25 MG TABS Take 12.5 mg by mouth daily Patient has temporarily stopped taking this medication due to her surgery. Was taking as half of a 25 mg tablet. 7/16/2018 Yes Unknown, Entered By History   ESCITALOPRAM OXALATE PO Take 10 mg by mouth daily 7/29/2018 at Unknown time Yes Unknown, Entered By History   HYDROXYZINE PAMOATE PO Take 25 mg by mouth every 6 hours as needed for itching  at prn Yes Unknown, Entered By History   LEVOTHYROXINE SODIUM PO Take 100 mcg by mouth daily 7/29/2018 at Unknown time Yes Unknown, Entered By History   OXYCODONE HCL PO Take 5-10 mg by mouth every 3 hours as needed  7/21/2018 at Unknown time Yes Unknown, Entered By History   PROGESTERONE MICRONIZED PO Take 100 mg by mouth 4 times daily Patient has temporarily stopped taking this medication due to her surgery.  7/16/2018 Yes Unknown, Entered By History   senna-docusate (SENOKOT-S;PERICOLACE)  8.6-50 MG per tablet Take 2 tablets by mouth daily as needed for constipation  7/21/2018 at Unknown time Yes Unknown, Entered By History   VITAMIN D, CHOLECALCIFEROL, PO Take 5,000 Units by mouth daily 7/29/2018 at Unknown time Yes Unknown, Entered By History

## 2018-07-30 NOTE — ED NOTES
"[facility]  ED Nurse Handoff Report    ED Chief complaint: Post-op Problem (Patient had surgery on 7/19 with Dr Hebert.  Displaced fibula surgery.  Went to Urgent Care today, sent here for further eval.  Discoloration to left leg, cramp, felt muscular.  Then all of sudden toes & leg turning purple.  Pain to posterior leg, cramping.  )      ED Diagnosis:   Final diagnoses:   None       Code Status: Full Code    Allergies:   Allergies   Allergen Reactions     Amoxicillin Swelling     Eye swell shut     Sulfa Drugs Rash       Activity level - Baseline/Home:  Independent    Activity Level - Current:   Stand with Assist     Needed?: No    Isolation: No  Infection: Not Applicable  Bariatric?: No    Vital Signs:   Vitals:    07/29/18 1750   BP: 112/78   Pulse: 91   Resp: 16   Temp: 98.1  F (36.7  C)   TempSrc: Oral   SpO2: 98%   Weight: 79.4 kg (175 lb)   Height: 1.626 m (5' 4\")       Cardiac Rhythm: ,        Pain level: 0-10 Pain Scale: 4    Is this patient confused?: No   [place] - Suicide Severity Rating Scale Completed?  Yes  If yes, what color did the patient score?  White    Patient Report: Initial Complaint: 7/19 had surgery for displaced fibula surgery.  Went to urgent care today, and sent to ER.  Discolored (purple) left lower ext with posterior cramping.  Toes and leg purple as well.  +1 doppler post tib and dorsalis pedis on left foot.  4/10 pain.  Ultrasound: extensive occlusive DVT throughout entire leg.   with her and supportive.  Focused Assessment: A/O x4.    Tests Performed: blood and ultrasound  Abnormal Results: Ultrasound  Treatments provided: vascular consult and assessed patient, heparin gtt running    Family Comments: none    OBS brochure/video discussed/provided to patient: No    ED Medications: Medications - No data to display    Drips infusing?:  Heparin    For the majority of the shift this patient was Green.   Interventions performed were " encouragement.    Severe Sepsis OR Septic Shock Diagnosis Present: No      ED NURSE PHONE NUMBER: [phone]

## 2018-07-30 NOTE — PLAN OF CARE
Problem: Patient Care Overview  Goal: Plan of Care/Patient Progress Review  Outcome: No Change  Arrived from ED around 2015. AVSS on RA. Pain 4-6/10 in LLE. CMS intact with 2+ pulses. Left toes are cool to touch; minor swelling. LLE elevated on 2 pillows. Regular diet. Heparin gtt at 11.5. Admission questions completed.

## 2018-07-30 NOTE — PROGRESS NOTES
Patient has appointment tomorrow with [facility] - Dr. Hebert's PAC Kenny.   Since patient admitted with DVT, Kenny will see patient tomorrow inpatient; Dr. Hebert also aware.    Coco Dunlap PAC

## 2018-07-30 NOTE — CONSULTS
[facility]    Vascular Medicine Consultation     Date of Admission:  7/29/2018  Date of Consult (When I saw the patient): 07/30/18         Physician Supervisory Attestation:   I have reviewed and discussed with the physician assistant their history, physical and plan and independently interviewed and examined Louann Claire and agree with the plan as stated in the physician assistant note.    Louann Cliare is a 52 year old female non-smoker who has been on hormone replacement therapy until about 10 days ago when she underwent surgical repair of a fibula fracture. She was at home recovering, with her leg in a cast and utilizing a scooter to get around. Yesterday she noted that her toes and left thigh were swollen and turning purple. She went to Urgent Care, who then directed her to the ER. In the ER, and ultrasound was significant for extensive occlusive DVT involving the common femoral vein, femoral vein, popliteal vein, posterior tibial veins, and peroneal veins. She was initiated on IV Heparin and admitted. She denies pain. Her leg feels heavy. She has been nonweightbearing since her surgery. The color in her leg has improved some overnight. She denies any respiratory symptoms.    No signs of phlegmasia this morning, leg, feet well perfused with normal motor and sensory Fx, palpable pulses.    Reviewed venous duplex  This is truly provoked ( Post op left fibfx repair with cast, non WB) first life time episode and no Family Hx of DVT/PE  She is obese, Rest of the exam unremarkable    At present our recommendations,   Continue IV heparin for now  Elevate left leg with leg elevator  No need for hypercoagulable studies  Since she is improving will not pursue venus lysis ( high risk for bleeding) , no respiratory symptoms normal sats on RA ( no need for IVC filter)  Ask pharmacy tech for DOAC coverage and cost to pt ( discussed options of oral anticoagulation DOACS vs warfarin pros and cons,  she is leaning towards DOACS if reasonable cost)  Plan for 6 months of anticoagulation  Monitor respiratory status, leg CMS  Ortho consult for post op check weight bearing etc  She will benefit with 20-30 mm HG thigh high compression stockings    Upon discharge, she will be given a prescription for compression stockings and an appointment will be arranged for follow up with Dr. Navarrete of Vascular Medicine at the [facility] in the next 2-3 weeks.     Thank you for the consult !    Vascular Medicine service will follow with you    copy: Dr. Tineo, Primary MD , MD Alma Boone MD ,[facility]  Vascular Medicine [place]  7/30/2018        Assessment & Plan   1. Provoked first lifetime extensive left lower extremity DVT in 52 year old woman who had been on hormone replacement therapy who is now 11 days post operative from left fibula fracture repair    This is her first lifetime DVT. It appears to be provoked in the setting of hormone replacement therapy and orthopedic surgery. The thrombus extends from the posterior tibial and peroneal veins all the way to the left common femoral vein. She has good distal pulses and her motor and sensory function is intact, indicating no evidence presently of phlegmasia. Her pain is well controlled and she appears to be tolerating well IV heparin. She should continue IV heparin for at least the next 24 hours. If at that time she has continued to do well, she can transition over to lovenox/coumadin or a DOAC (Xarelto or Eliquis). The options of oral anticoagulation were discussed with her. She would prefer a DOAC if she can financially afford it. The pharmacy liaison was contacted and will run the price of the medications for her. Given the extent of her clot, would favor a 6 month course of anticoagulation.     As she just recently had surgery in the past 2 weeks and she is tolerating IV heparin, it is felt that the risks of  catheter-directed thrombolytic therapy outweigh the benefits at this time. If she should not improve or show signs of clot progression on IV heparin, then this could be considered. No hypercoagulable work-up is needed at this time as this appears to be entirely provoked. She should refrain from utilizing hormone replacement therapy moving forward. Orthopedic Surgery will be consulted to provide recommendations on wrapping/cast and any activity restrictions. She should elevate her leg as much as able with a blue leg elevator pillow. Upon discharge, she will be given a prescription for compression stockings and an appointment will be arranged for follow up with Dr. Navarrete of Vascular Medicine at the [facility] in the next 2-3 weeks.     2. Depression    This has been under control with Lexapro. Continue the same.     3. Hypothyroidism    She appears euthyroid and is followed by Endocrinology. Continue Levothyroxine.       Reason for Consult   Reason for consult: Asked by Dr. Tineo to evaluate and assist with anticoagulation management in this 52 year old female who had been on hormone replacement therapy and recently underwent surgical repair of a left fibula fracture and now presents with an extensive left lower extremity DVT.     Primary Care Physician   Eve Stevenson MD      History of Present Illness   Louann Claire is a 52 year old female non-smoker who has been on hormone replacement therapy until about 10 days ago when she underwent surgical repair of a fibula fracture. She was at home recovering, with her leg in a cast and utilizing a scooter to get around. Yesterday she noted that her toes and left thigh were swollen and turning purple. She went to Urgent Care, who then directed her to the ER. In the ER, and ultrasound was significant for extensive occlusive DVT involving the common femoral vein, femoral vein, popliteal vein, posterior tibial veins, and peroneal veins. She  was initiated on IV Heparin and admitted. She denies pain. Her leg feels heavy. She has been nonweightbearing since her surgery. The color in her leg has improved some overnight. She denies any respiratory symptoms.      Past Medical History   Past Medical History:   Diagnosis Date     Anxiety      Thyroid disease        Past Surgical History   Past Surgical History:   Procedure Laterality Date     DILATION AND CURETTAGE  2004     ORTHOPEDIC SURGERY  07/19/2018    Fibula surgery       Prior to Admission Medications   Prior to Admission Medications   Prescriptions Last Dose Informant Patient Reported? Taking?   ACETAMINOPHEN PO  at prn Self Yes Yes   Sig: Take 1,000 mg by mouth every 8 hours as needed for pain   ASPIRIN EC PO 7/29/2018 at Unknown time Self Yes Yes   Sig: Take 325 mg by mouth daily    Ascorbic Acid (VITAMIN C PO) 7/29/2018 at Unknown time Self Yes Yes   Sig: Take 500 mg by mouth daily   ESCITALOPRAM OXALATE PO 7/29/2018 at Unknown time Self Yes Yes   Sig: Take 10 mg by mouth daily   HYDROXYZINE PAMOATE PO  at prn Self Yes Yes   Sig: Take 25 mg by mouth every 6 hours as needed for itching   LEVOTHYROXINE SODIUM PO 7/29/2018 at Unknown time Self Yes Yes   Sig: Take 100 mcg by mouth daily   OXYCODONE HCL PO 7/21/2018 at Unknown time Self Yes Yes   Sig: Take 5-10 mg by mouth every 3 hours as needed    PROGESTERONE MICRONIZED PO 7/16/2018 Self Yes Yes   Sig: Take 100 mg by mouth 4 times daily Patient has temporarily stopped taking this medication due to her surgery.    VITAMIN D, CHOLECALCIFEROL, PO 7/29/2018 at Unknown time Self Yes Yes   Sig: Take 5,000 Units by mouth daily   dhea 25 MG TABS 7/16/2018 Self Yes Yes   Sig: Take 12.5 mg by mouth daily Patient has temporarily stopped taking this medication due to her surgery. Was taking as half of a 25 mg tablet.   senna-docusate (SENOKOT-S;PERICOLACE) 8.6-50 MG per tablet 7/21/2018 at Unknown time Self Yes Yes   Sig: Take 2 tablets by mouth daily as needed  for constipation       Facility-Administered Medications: None     Allergies   Allergies   Allergen Reactions     Amoxicillin Swelling     Eye swell shut     Sulfa Drugs Rash       Social History   Louann Claire  reports that she has never smoked. She has never used smokeless tobacco. She reports that she drinks alcohol. She reports that she does not use illicit drugs. She is  and works in sales.     Family History   No family history of DVT/PE. Father with stroke and heart problems, unsure if he had DVT ever.     Review of Systems   The 10 point Review of Systems is negative other than noted in the HPI or here.     Physical Exam   Temp: 98.5  F (36.9  C) Temp src: Oral BP: 112/73 Pulse: 74   Resp: 16 SpO2: 94 % O2 Device: None (Room air)    Vital Signs with Ranges  Temp:  [97.8  F (36.6  C)-99  F (37.2  C)] 98.5  F (36.9  C)  Pulse:  [74-91] 74  Resp:  [16] 16  BP: (112-133)/(70-89) 112/73  SpO2:  [94 %-98 %] 94 %  175 lbs 0 oz    Constitutional: awake, alert, cooperative, no apparent distress, and appears stated age  Eyes: Lids and lashes normal, pupils equal, round and reactive to light, extra ocular muscles intact, sclera clear, conjunctiva normal  ENT: normocepalic, without obvious abnormality, oropharynx pink and moist  Hematologic / Lymphatic: no lymphadenopathy  Respiratory: No increased work of breathing, good air exchange, clear to auscultation bilaterally, no crackles or wheezing  Cardiovascular: regular rate and rhythm, normal S1 and S2 and no murmur noted  GI: Normal bowel sounds, soft, non-distended, non-tender  Skin: no redness, warmth, or swelling, no rashes. Color in left leg looks better today than pictures in chart from yesterday.   Musculoskeletal: There is no redness, warmth, or swelling of the joints.  Full range of motion noted.  Motor strength is 5 out of 5 all extremities bilaterally.  Tone is normal.  Neurologic: Awake, alert, oriented to name, place and time.  Cranial nerves  II-XII are grossly intact.  Motor is 5 out of 5 bilaterally.    Neuropsychiatric:  Normal affect, memory, insight.      Data   Most Recent 3 CBC's:  Recent Labs   Lab Test  07/30/18   0827  07/29/18   1823   WBC  9.8  12.3*   HGB  11.8  13.5   MCV  86  87   PLT  285  298     Most Recent 3 BMP's:  Recent Labs   Lab Test  07/29/18   1823   NA  140   POTASSIUM  3.4   CHLORIDE  105   CO2  25   BUN  20   CR  0.70   ANIONGAP  10   ANA LUISA  8.6   GLC  187*     Most Recent 3 INR's:No lab results found.  Most Recent Cholesterol Panel:No lab results found.  Most Recent Hemoglobin A1c:No lab results found.

## 2018-07-30 NOTE — PHARMACY
Medication coverage check for Eliquis or Xarelto. $10 copay for either with copay card from discharge pharmacy.  Risa Diaz CphT  [facility] Discharge Pharmacy Liaison  Liason Cell: [phone]

## 2018-07-30 NOTE — PLAN OF CARE
Problem: Patient Care Overview  Goal: Plan of Care/Patient Progress Review  A&Ox4. VSS on RA. SBA. Tolerating regular diet. LS clear, BS active, + flatus. LLE ace wrapped & elevated on blue wedge pillow, pulses 1+ doppler. Denies pain/nausea & numbness/tingling. Heparin gtt infusing. Voiding, had BM.

## 2018-07-30 NOTE — PROGRESS NOTES
VASCULAR SURGERY    I again discussed the situation with Louann Claire.  Remains very comfortable.  She is on therapeutic heparin.    I reviewed the situation with our interventional radiologist and also with vascular medicine.  She has ORIF with plating of her tibial/fibular distal fracture.  Lytic therapy is relatively contraindication with recent surgery.  Since this does not involve the iliac veins and she is clinically improving we all feel that conservative treatment with anticoagulation is indicated.    Dr Navarrete concurs with this.  Etiology of the clot is due to the fracture and immobilization in a cast.  No family medical history.  Thus hypercoagulable workup is not needed.  Likely will initiate on oral anticoagulation with DOACS tomorrow morning and possibly discharge later in the day.      Alan Tineo MD

## 2018-07-30 NOTE — CONSULTS
[facility]    Vascular Surgery Consultation    Date of Admission:  7/29/2018    Assessment & Plan   Louann Claire is a 52 year old female who was admitted on 7/29/2018. I was asked to see the patient for LLE DVT.  Extensive DVT from fem through popliteal and tibial/peroneal veins.  Motor/sensory intact with Dopplerable L DP/PT signals.  Distal disease likely not amenable to endovascular intervention.  Continue nonop management with the heparin gtt.    -admit to Vascular, Dr. Tineo  -continue heparin gtt  -consult Vascular Med in AM for recs re: PO alternatives and further treatment   -elevated and wrap the LLE  -of note, prior to discharge, will need to touchbase with Ortho re: splint/boot replacement    The plan was discussed and agreed up on by the on call staff surgeon, Dr. Tineo.    Active Problems:    * No active hospital problems. *      Felix Quintana MD    Code Status    No Order    Reason for Consult   Reason for consult: I was asked by the ED to evaluate this patient for LLE DVT.    Primary Care Physician   Eve Stevesnon MD    Chief Complaint    Hamstring discoloration    History is obtained from the patient    History of Present Illness   Louann Claire is a 52 year old female with no significant PMH presents to the ED for surgical evaluation for a LLE DVT.  Pt is s/p repair of fibula fx at O 10-12 days ago.  Pt notes stable LLE swelling that she attributed to a normal postop course.  Today, however, she noted discoloration and discomfort of her posterior thigh.  Denies LLE numbness/tingling/motor dysfunction.  Endorses nausea, but denies f/c/v/cp/sob.  Denies hx of prior DVT or hypercoagulable disorder.  Denies tobacco abuse.  Has taken an aspirin periop, but denies any other blood thinner.    On exam, afebrile, VSS.  Cr 0.7, WBC 12, Hgb 13.  US notable for extensive DVT from common femoral to popliteal to posterior tibial and peroneal.  Started on a heparin gtt in the  ED.  Vascular Surgery consulted for LLE DVT.    Past Medical History   I have reviewed this patient's medical history and updated it with pertinent information if needed.   Past Medical History:   Diagnosis Date     Anxiety      Thyroid disease        Past Surgical History   I have reviewed this patient's surgical history and updated it with pertinent information if needed.  Past Surgical History:   Procedure Laterality Date     DILATION AND CURETTAGE  2004     ORTHOPEDIC SURGERY  07/19/2018    Fibula surgery       Prior to Admission Medications   None     Allergies   Allergies   Allergen Reactions     Amoxicillin Swelling     Eye swell shut     Sulfa Drugs Rash       Social History   I have reviewed this patient's social history and updated it with pertinent information if needed. Louann Claire  reports that she has never smoked. She has never used smokeless tobacco. She reports that she drinks alcohol. She reports that she does not use illicit drugs.    Family History   I have reviewed this patient's family history and updated it with pertinent information if needed.   No family history on file.    Review of Systems   The 10 point Review of Systems is negative other than noted in the HPI or here.     Physical Exam   Temp: 98.1  F (36.7  C) Temp src: Oral BP: 112/78 Pulse: 91   Resp: 16 SpO2: 98 % O2 Device: None (Room air)    Vital Signs with Ranges  Temp:  [98.1  F (36.7  C)] 98.1  F (36.7  C)  Pulse:  [91] 91  Resp:  [16] 16  BP: (112)/(78) 112/78  SpO2:  [98 %] 98 %  175 lbs 0 oz    Constitutional:  NAD  HEENT: normocephalic  CV: RRR  Pulm: CTAB, nonlabored respirations  GI: soft, NT/ND  MSK: warm, dry, pink, 2+ radials/femorals bilaterally and R DP/PT  LLE: hyperpigmented, edematous, blanching, Dopplerable DP/PT; motor/sensory intact  Neuro: A&O, motor/sensory grossly intact  Psych: Appropriate      Data   Results for orders placed or performed during the hospital encounter of 07/29/18 (from the past 24  hour(s))   CBC with platelets + differential   Result Value Ref Range    WBC 12.3 (H) 4.0 - 11.0 10e9/L    RBC Count 4.65 3.8 - 5.2 10e12/L    Hemoglobin 13.5 11.7 - 15.7 g/dL    Hematocrit 40.3 35.0 - 47.0 %    MCV 87 78 - 100 fl    MCH 29.0 26.5 - 33.0 pg    MCHC 33.5 31.5 - 36.5 g/dL    RDW 14.0 10.0 - 15.0 %    Platelet Count 298 150 - 450 10e9/L    Diff Method Automated Method     % Neutrophils 78.5 %    % Lymphocytes 15.0 %    % Monocytes 4.7 %    % Eosinophils 1.2 %    % Basophils 0.3 %    % Immature Granulocytes 0.3 %    Nucleated RBCs 0 0 /100    Absolute Neutrophil 9.7 (H) 1.6 - 8.3 10e9/L    Absolute Lymphocytes 1.9 0.8 - 5.3 10e9/L    Absolute Monocytes 0.6 0.0 - 1.3 10e9/L    Absolute Eosinophils 0.2 0.0 - 0.7 10e9/L    Absolute Basophils 0.0 0.0 - 0.2 10e9/L    Abs Immature Granulocytes 0.0 0 - 0.4 10e9/L    Absolute Nucleated RBC 0.0    Basic metabolic panel   Result Value Ref Range    Sodium 140 133 - 144 mmol/L    Potassium 3.4 3.4 - 5.3 mmol/L    Chloride 105 94 - 109 mmol/L    Carbon Dioxide 25 20 - 32 mmol/L    Anion Gap 10 3 - 14 mmol/L    Glucose 187 (H) 70 - 99 mg/dL    Urea Nitrogen 20 7 - 30 mg/dL    Creatinine 0.70 0.52 - 1.04 mg/dL    GFR Estimate 88 >60 mL/min/1.7m2    GFR Estimate If Black >90 >60 mL/min/1.7m2    Calcium 8.6 8.5 - 10.1 mg/dL   US Lower Extremity Venous Duplex Left    Narrative    ULTRASOUND VENOUS LOWER EXTREMITY UNILATERAL LEFT  7/29/2018 7:28 PM     HISTORY: LE color change and swelling, rule out evidence of DVT.     COMPARISON: None.    TECHNIQUE: Ultrasound gray scale, Color Doppler flow, and spectral  Doppler waveform analysis performed.    FINDINGS: Extensive occlusive DVT involving the common femoral vein,  femoral vein, popliteal vein, posterior tibial veins, and peroneal  veins.       Impression    IMPRESSION: Extensive DVT throughout the entire left lower extremity.    REUBEN MEDINA MD           STAFF:  As above.   ORIF on 07/17/18 with cast till yesterday and  noted more toe discoloration.  BK cast removed in urgent care.  Duplex with DVT from CFV distally (not iliac).  On Heparin.  ?? [employer].  Discussed with pt and also talk to IR.      Alan Tineo MD

## 2018-07-30 NOTE — PLAN OF CARE
Problem: Patient Care Overview  Goal: Plan of Care/Patient Progress Review  Outcome: No Change  Vital signs stable. Alert and oriented. Lung sounds clear. Bowel sounds active, flatus present. Left  leg ace wrapped with mild swelling and warmth. Pulses +1 palpable, patient denies numbness or tingling. Dorsiflexion intact, extremity still elevated. Pain controlled with tramadol prn x1. Up stand by assist. Tolerating regular diet. Heparin gtt decreased from 11.5 to 10.5 per pharmacy orders.

## 2018-07-30 NOTE — PROGRESS NOTES
Vascular Surgery Progress Note    S: Very comfortable through night.  No shortness of breath.    O:   Vitals:  BP  Min: 112/72  Max: 133/89  Temp  Av.4  F (36.9  C)  Min: 97.8  F (36.6  C)  Max: 99  F (37.2  C)  Pulse  Av.3  Min: 77  Max: 91  I/O last 3 completed shifts:  In: 300 [P.O.:300]  Out: -     Physical Exam: Alert and appropriate.                            Chest= clear.                             Stable swelling to the left leg from the groin to toes.                             Ace bandage on calf and distal thigh.                             CMS arrangements the patient is is normal.     Assessment/Plan: Extensive Left leg DVT not involving the iliac veins.  On therapeutic Heparin.  The decision on whether tPa  indicated in this situation.  There is certainly higher risk of bleeding with her ORIF 2 weeks ago that lytic therapy is not absolutely contraindicated.  I discussed this with the patient.  Also reviewed this with her vascular medicine team and interventional radiology.  Until then will continue on intravenous heparin.      Wm. Rivka MD

## 2018-07-31 ENCOUNTER — APPOINTMENT (OUTPATIENT)
Dept: ULTRASOUND IMAGING | Facility: CLINIC | Age: 53
DRG: 301 | End: 2018-07-31
Attending: SURGERY
Payer: COMMERCIAL

## 2018-07-31 VITALS
HEIGHT: 64 IN | WEIGHT: 175 LBS | HEART RATE: 81 BPM | RESPIRATION RATE: 16 BRPM | OXYGEN SATURATION: 94 % | TEMPERATURE: 97.6 F | BODY MASS INDEX: 29.88 KG/M2 | DIASTOLIC BLOOD PRESSURE: 77 MMHG | SYSTOLIC BLOOD PRESSURE: 123 MMHG

## 2018-07-31 LAB
GLUCOSE SERPL-MCNC: 174 MG/DL (ref 70–99)
LMWH PPP CHRO-ACNC: 0.41 IU/ML

## 2018-07-31 PROCEDURE — 99233 SBSQ HOSP IP/OBS HIGH 50: CPT | Performed by: INTERNAL MEDICINE

## 2018-07-31 PROCEDURE — 25000132 ZZH RX MED GY IP 250 OP 250 PS 637: Performed by: STUDENT IN AN ORGANIZED HEALTH CARE EDUCATION/TRAINING PROGRAM

## 2018-07-31 PROCEDURE — 36415 COLL VENOUS BLD VENIPUNCTURE: CPT | Performed by: STUDENT IN AN ORGANIZED HEALTH CARE EDUCATION/TRAINING PROGRAM

## 2018-07-31 PROCEDURE — 82947 ASSAY GLUCOSE BLOOD QUANT: CPT | Performed by: STUDENT IN AN ORGANIZED HEALTH CARE EDUCATION/TRAINING PROGRAM

## 2018-07-31 PROCEDURE — 25000132 ZZH RX MED GY IP 250 OP 250 PS 637: Performed by: INTERNAL MEDICINE

## 2018-07-31 PROCEDURE — 93971 EXTREMITY STUDY: CPT | Mod: LT

## 2018-07-31 PROCEDURE — 85520 HEPARIN ASSAY: CPT | Performed by: STUDENT IN AN ORGANIZED HEALTH CARE EDUCATION/TRAINING PROGRAM

## 2018-07-31 PROCEDURE — 99231 SBSQ HOSP IP/OBS SF/LOW 25: CPT | Performed by: SURGERY

## 2018-07-31 RX ADMIN — ESCITALOPRAM 10 MG: 10 TABLET, FILM COATED ORAL at 10:08

## 2018-07-31 RX ADMIN — ACETAMINOPHEN 975 MG: 325 TABLET, FILM COATED ORAL at 05:48

## 2018-07-31 RX ADMIN — RIVAROXABAN 15 MG: 15 TABLET, FILM COATED ORAL at 12:49

## 2018-07-31 RX ADMIN — LEVOTHYROXINE SODIUM 100 MCG: 100 TABLET ORAL at 10:08

## 2018-07-31 RX ADMIN — ACETAMINOPHEN 975 MG: 325 TABLET, FILM COATED ORAL at 14:38

## 2018-07-31 ASSESSMENT — ACTIVITIES OF DAILY LIVING (ADL)
ADLS_ACUITY_SCORE: 28

## 2018-07-31 NOTE — PLAN OF CARE
Problem: Patient Care Overview  Goal: Plan of Care/Patient Progress Review  A+Ox4. VSS on RA. Up SBA to BSC. CMS intact. LLE pulses per dopper. RLE pulses palpable, 2+. LLE edema 2+, elevated on wedge, ace wrap in place. Hep gtt infusing at 10.5 mL/hr. Pain managed with scheduled tylenol. Regular diet.

## 2018-07-31 NOTE — DISCHARGE SUMMARY
[facility]    Discharge Summary  Vascular Medicine           Physician Supervisory Attestation:   I have reviewed and discussed with the physician assistant their history, physical and plan and independently interviewed and examined Louann Claire and agree with the plan as stated in the physician assistant note.    Left leg looks much better compared to admission, less swelling, motor, sensory Fx normal, palpable DP and PT  Repeat US of LLE unchanged DVT no proximal extension.  Ortho planning to see her today  She prefers once a day DOAC , educated pros and cons no antidote etc.   No respiratory symptoms. Elevate left leg, compression stockings 20-30 thigh high  Start xarelto 15 mg bid for 21 days with food then 20 mg daily with supper.  Follow ortho recommendations for weight bear etc.  Follow up with Dr. Navarrete in 2 weeks at [facility], I have given my card.    Patient care time spent 35 minutes today.    Copy to :  Dr. Tineo, primary MD    Alma Navarrete MD ,[facility]  Vascular Medicine Service.  7/31/2018        Date of Admission:  7/29/2018  Date of Discharge:  7/31/2018  Discharging Provider: Alma Navarrete MD  Date of Service (when I saw the patient): 07/31/18      Discharge Diagnoses   1. Provoked first lifetime extensive left lower extremity DVT in 52 year old woman who had been on hormone replacement therapy who presented 11 days post operative from left fibula fracture repair     2. Depression     3. Hypothyroidism      Hospital Course   Louann Claire was admitted on 7/29/2018.  The following problems were addressed during her hospitalization:    1. Provoked first lifetime extensive left lower extremity DVT in 52 year old woman who had been on hormone replacement therapy who  presented 11 days post operative from left fibula fracture repair     This is her first lifetime DVT. It appears to be provoked in the setting of hormone replacement therapy and recent  orthopedic surgery. The thrombus extends from the left posterior tibial and peroneal veins all the way to the left common femoral vein. She has good distal pulses and her motor and sensory function is intact, indicating no evidence of phlegmasia. She was initiated on IV heparin and admitted. She tolerated this well. This was continued for over 24 hours and a repeat venous duplex the morning of discharge showed no evidence of propagation of thrombus. She was transitioned over to Xarelto. She will start with 15 mg twice daily for 21 days, followed by 20 mg daily with supper thereafter. Given the extent of her clot, would favor a 6 month course of anticoagulation.      As she just recently had surgery in the past 2 weeks and she has shown improvement on IV heparin, it was felt that the risks of catheter-directed thrombolytic therapy outweigh the benefits at this time. No hypercoagulable work-up is needed at this time as this appears to be entirely provoked. She should refrain from utilizing hormone replacement therapy moving forward. It was advised that she follow-up with who is prescribing this. She was scheduled to see Orthopedic Surgery on the day of discharge in clinic. However, they were contacted and will see her in the hospital instead to provide recommendations on wrapping/cast and any activity restrictions. She should elevate her leg as much as able with a blue leg elevator pillow and she has been given a prescription for compression stockings (20-30 mmHg thigh high).  She should follow up with Dr. Navarrete of Vascular Medicine at the [facility] in the next 2 weeks.      2. Depression     This has been under control with Lexapro. Continue the same.      3. Hypothyroidism     She appears euthyroid and is followed by Endocrinology. Continue Levothyroxine.     Code Status   Full Code    Primary Care Physician   Eve Stevenson MD    Time Spent on this Encounter   Spent greater than 30  minutes discharging this patient.    Discharge Disposition   Discharged to home  Condition at discharge: Stable    Consultations This Hospital Stay   PHARMACY TO DOSE HEPARIN  [place] [facility] IP CONSULT  ORTHOPEDICS IP CONSULT  ORTHOSIS EXTREMITY LOWER REFERRAL IP CONSULT  PHARMACY IP CONSULT    Discharge Orders     Reason for your hospital stay   Left lower extremity DVT.     Follow-up and recommended labs and tests    Follow up with Dr. Navarrete of Vascular Medicine in [facility]. Please call [phone] to make an appointment.     Follow up with OB/GYN or Endocrinology regarding your hormone replacement therapy. You should ideally remain off of these for now given your DVT.     Activity   Your activity upon discharge: Per Orthopedic Surgery     Discharge Instructions   You should take Xarelto 15 mg twice daily (with breakfast and supper - must be taken with food) for 21 days. After you complete 15 mg tablets, you will switch to 20 mg tablets only once daily with supper. Dr. Navarrete will prescribe these tablets to you during follow-up.     If you have any questions or concerns regarding your anticoagulation or clot, please contact Dr. Navarrete at [phone].     Elevate leg as much as possible with blue wedge pillow.     Once cleared by Orthopedic Surgery, wear compression stockings (20-30 mmHg thigh high).     Full Code     Diet   Follow this diet upon discharge:  Regular Diet Adult       Discharge Medications   Current Discharge Medication List      START taking these medications    Details   rivaroxaban ANTICOAGULANT (XARELTO) 15 MG TABS tablet Take 1 tablet (15 mg) by mouth 2 times daily (with meals)  Qty: 41 tablet, Refills: 0    Associated Diagnoses: DVT, lower extremity, distal, acute, left (H)         CONTINUE these medications which have NOT CHANGED    Details   ACETAMINOPHEN PO Take 1,000 mg by mouth every 8 hours as needed for  pain      Ascorbic Acid (VITAMIN C PO) Take 500 mg by mouth daily      ESCITALOPRAM OXALATE PO Take 10 mg by mouth daily      HYDROXYZINE PAMOATE PO Take 25 mg by mouth every 6 hours as needed for itching      LEVOTHYROXINE SODIUM PO Take 100 mcg by mouth daily      OXYCODONE HCL PO Take 5-10 mg by mouth every 3 hours as needed       senna-docusate (SENOKOT-S;PERICOLACE) 8.6-50 MG per tablet Take 2 tablets by mouth daily as needed for constipation       VITAMIN D, CHOLECALCIFEROL, PO Take 5,000 Units by mouth daily         STOP taking these medications       ASPIRIN EC PO Comments:   Reason for Stopping:         dhea 25 MG TABS Comments:   Reason for Stopping:         PROGESTERONE MICRONIZED PO Comments:   Reason for Stopping:             Allergies   Allergies   Allergen Reactions     Amoxicillin Swelling     Eye swell shut     Sulfa Drugs Rash     Data   Most Recent 3 CBC's:  Recent Labs   Lab Test  07/30/18   0827  07/29/18   1823   WBC  9.8  12.3*   HGB  11.8  13.5   MCV  86  87   PLT  285  298      Most Recent 3 BMP's:  Recent Labs   Lab Test  07/31/18   0847  07/29/18   1823   NA   --   140   POTASSIUM   --   3.4   CHLORIDE   --   105   CO2   --   25   BUN   --   20   CR   --   0.70   ANIONGAP   --   10   ANA LUISA   --   8.6   GLC  174*  187*       Most Recent TSH, T4 and A1c Labs:  Recent Labs   Lab Test  07/29/18   1823   A1C  6.0*     Results for orders placed or performed during the hospital encounter of 07/29/18   US Lower Extremity Venous Duplex Left    Narrative    ULTRASOUND VENOUS LOWER EXTREMITY UNILATERAL LEFT  7/29/2018 7:28 PM     HISTORY: LE color change and swelling, rule out evidence of DVT.     COMPARISON: None.    TECHNIQUE: Ultrasound gray scale, Color Doppler flow, and spectral  Doppler waveform analysis performed.    FINDINGS: Extensive occlusive DVT involving the common femoral vein,  femoral vein, popliteal vein, posterior tibial veins, and peroneal  veins.       Impression    IMPRESSION:  Extensive DVT throughout the entire left lower extremity.    REUBEN MEDINA MD

## 2018-07-31 NOTE — PLAN OF CARE
Problem: Patient Care Overview  Goal: Plan of Care/Patient Progress Review  Outcome: Adequate for Discharge Date Met: 07/31/18  Pt discharging to home with . Patient discharge instructions and patient teaching was done. Patients vss on RA. Denies pain. Pt was discharged around 1500

## 2018-07-31 NOTE — PLAN OF CARE
Problem: Patient Care Overview  Goal: Plan of Care/Patient Progress Review  Outcome: Improving  A/O x4. AVSS on RA. Up SBA. Pain managed w/ PRN tramadol and scheduled Tylenol. CMS intact. Heparin infusing @ 10.5 ml/hr. LLE edema, ace wrap in place, elevated on foam wedge. LLE pulses, doppler. RLE pulses palpable, +2. Tolerating regular diet. Voiding.

## 2018-07-31 NOTE — PROGRESS NOTES
Vascular Surgery Progress Note    S:  No complaints.  Leg feels fine.  No SOB    O:   Vitals:  BP  Min: 108/71  Max: 122/79  Temp  Av.5  F (36.9  C)  Min: 98.3  F (36.8  C)  Max: 98.8  F (37.1  C)  Pulse  Av  Min: 74  Max: 88  I/O last 3 completed shifts:  In: 822 [P.O.:500; I.V.:322]  Out: -     Physical Exam: Stable left leg swelling                             CMS=normal      Assessment/Plan: Doing well.  Check Duplex of left CFV to make sure no propagation of DVT on Heparin.  If stable OK to change to Eliquis,etc.                    BK Jobst compression stockings.       Wm. Rivka MD

## 2018-07-31 NOTE — PROGRESS NOTES
"[facility]  Orthopaedics/Foot and Ankle Surgery  Daily Post-Op Note    07/31/2018          Assessment and Plan:    Assessment:   Post-operative day #12  S/P ORIF left distal fibula fracture with syndesmosis injury.   Doing well.  Clean wound without signs of infection.  Normal healing wound.  Pain well-controlled.      Plan:   1. NWB LLE with boot immobilization.  May keep elevated while at rest to limit swelling and pain.  2. Cont. current pain regimen.  Under appropriate control at this time.  3. Plan d/c when medically cleared.              Interval History:   Patient developed a left DVT on post op day 10 and presented to the orthopedic urgent care where she was transitioned from her splint to a CAM boot and was sent to the ER for a doppler ultrasound to R/O DVT. The patient was diagnosed with a LLE DVT and admitted to [facility]. The patient states that her pain has improved and swelling continues to improve. The patient has been receiving IV Heparin and will be transitioned to lovenox at her discharge.               Physical Exam:   Blood pressure 123/77, pulse 81, temperature 97.6  F (36.4  C), temperature source Oral, resp. rate 16, height 1.626 m (5' 4\"), weight 79.4 kg (175 lb), SpO2 94 %.  I/O last 3 completed shifts:  In: 822 [P.O.:500; I.V.:322]  Out: -     Wound clean and dry with minimal or no drainage.  Surrounding skin with minimal erythema. Moderate LLE swelling, sutures were left in today. Sensation is intact in all peripheral nerve distributions in the LLE. Toes are all warm and well perfused with brisk capillary refill.              Data:   All laboratory data related to this surgery reviewed  Recent Labs   Lab Test  07/30/18 0827 07/29/18   1823   HGB  11.8  13.5     No lab results found.   Recent Labs   Lab Test  07/30/18 0827 07/29/18   1823   WBC  9.8  12.3*   PLT  285  298   POTASSIUM   --   3.4   CR   --   0.70       "

## 2018-08-10 ENCOUNTER — TELEPHONE (OUTPATIENT)
Dept: OTHER | Facility: CLINIC | Age: 53
End: 2018-08-10

## 2018-08-10 ENCOUNTER — HOSPITAL ENCOUNTER (OUTPATIENT)
Dept: LAB | Facility: CLINIC | Age: 53
Discharge: HOME | End: 2018-08-10
Attending: INTERNAL MEDICINE | Admitting: INTERNAL MEDICINE
Payer: COMMERCIAL

## 2018-08-10 ENCOUNTER — OFFICE VISIT (OUTPATIENT)
Dept: OTHER | Facility: CLINIC | Age: 53
End: 2018-08-10
Attending: INTERNAL MEDICINE
Payer: COMMERCIAL

## 2018-08-10 ENCOUNTER — HOSPITAL ENCOUNTER (OUTPATIENT)
Dept: LAB | Facility: CLINIC | Age: 53
End: 2018-08-10
Attending: INTERNAL MEDICINE
Payer: COMMERCIAL

## 2018-08-10 ENCOUNTER — NURSE TRIAGE (OUTPATIENT)
Dept: NURSING | Facility: CLINIC | Age: 53
End: 2018-08-10

## 2018-08-10 DIAGNOSIS — K92.1 BLOOD IN STOOL: Primary | ICD-10-CM

## 2018-08-10 DIAGNOSIS — K92.1 BLOOD IN STOOL: ICD-10-CM

## 2018-08-10 DIAGNOSIS — Z01.30 BP CHECK: Primary | ICD-10-CM

## 2018-08-10 LAB
BASOPHILS # BLD AUTO: 0.1 10E9/L (ref 0–0.2)
BASOPHILS NFR BLD AUTO: 0.6 %
DIFFERENTIAL METHOD BLD: ABNORMAL
EOSINOPHIL # BLD AUTO: 0.3 10E9/L (ref 0–0.7)
EOSINOPHIL NFR BLD AUTO: 3.6 %
ERYTHROCYTE [DISTWIDTH] IN BLOOD BY AUTOMATED COUNT: 14.3 % (ref 10–15)
HCT VFR BLD AUTO: 38.1 % (ref 35–47)
HEMOCCULT STL QL: POSITIVE
HGB BLD-MCNC: 12.3 G/DL (ref 11.7–15.7)
IMM GRANULOCYTES # BLD: 0 10E9/L (ref 0–0.4)
IMM GRANULOCYTES NFR BLD: 0.2 %
LYMPHOCYTES # BLD AUTO: 2.1 10E9/L (ref 0.8–5.3)
LYMPHOCYTES NFR BLD AUTO: 24.5 %
MCH RBC QN AUTO: 28.1 PG (ref 26.5–33)
MCHC RBC AUTO-ENTMCNC: 32.3 G/DL (ref 31.5–36.5)
MCV RBC AUTO: 87 FL (ref 78–100)
MONOCYTES # BLD AUTO: 0.7 10E9/L (ref 0–1.3)
MONOCYTES NFR BLD AUTO: 7.9 %
NEUTROPHILS # BLD AUTO: 5.5 10E9/L (ref 1.6–8.3)
NEUTROPHILS NFR BLD AUTO: 63.2 %
PLATELET # BLD AUTO: 565 10E9/L (ref 150–450)
RBC # BLD AUTO: 4.37 10E12/L (ref 3.8–5.2)
WBC # BLD AUTO: 8.7 10E9/L (ref 4–11)

## 2018-08-10 PROCEDURE — 85025 COMPLETE CBC W/AUTO DIFF WBC: CPT | Performed by: INTERNAL MEDICINE

## 2018-08-10 PROCEDURE — 82272 OCCULT BLD FECES 1-3 TESTS: CPT | Performed by: INTERNAL MEDICINE

## 2018-08-10 PROCEDURE — 36415 COLL VENOUS BLD VENIPUNCTURE: CPT | Performed by: INTERNAL MEDICINE

## 2018-08-10 NOTE — MR AVS SNAPSHOT
"              After Visit Summary   8/10/2018    Louann Claire    MRN: [id number]           Patient Information     Date Of Birth          1965        Visit Information        Provider Department      8/10/2018 11:00 AM Nurse, [facility]        Today's Diagnoses     BP check    -  1       Follow-ups after your visit        Your next 10 appointments already scheduled     Aug 15, 2018 12:00 PM CDT   New Visit with Alma Navarrete MD   [facility] ([facility])    [address]  [place] [address] [phone]              Future tests that were ordered for you today     Open Future Orders        Priority Expected Expires Ordered    CBC with platelets differential STAT 8/10/2018 8/10/2019 8/10/2018    Occult blood stool STAT 8/10/2018 8/10/2019 8/10/2018            Who to contact     If you have questions or need follow up information about today's clinic visit or your schedule please contact [facility] directly at [phone].  Normal or non-critical lab and imaging results will be communicated to you by [employer], letter or phone within 4 business days after the clinic has received the results. If you do not hear from us within 7 days, please contact the clinic through [employer] or phone. If you have a critical or abnormal lab result, we will notify you by phone as soon as possible.  Submit refill requests through [employer] or call your pharmacy and they will forward the refill request to us. Please allow 3 business days for your refill to be completed.          Additional Information About Your Visit        MyChart Information     [employer] lets you send messages to your doctor, view your test results, renew your prescriptions, schedule appointments and more. To sign up, go to [email or web address] . Click on \"Log in\" on the left side of the screen, which will " "take you to the Welcome page. Then click on \"Sign up Now\" on the right side of the page.     You will be asked to enter the access code listed below, as well as some personal information. Please follow the directions to create your username and password.     Your access code is: [email or web address]  Expires: 10/29/2018  2:58 PM     Your access code will  in 90 days. If you need help or a new code, please call your [facility] clinic or [phone].        Care EveryWhere ID     This is your Care EveryWhere ID. This could be used by other organizations to access your [facility] medical records  [id number]         Blood Pressure from Last 3 Encounters:   18 123/77    Weight from Last 3 Encounters:   18 175 lb (79.4 kg)              Today, you had the following     No orders found for display       Primary Care Provider Office Phone # Fax #    Eve Stevenson [phone]       [facility] [address]  [facility] [address]        Equal Access to Services     [facility]: Hadii aad ku hadasho Soomaali, waaxda luqadaha, qaybta kaalmada adeegyada, waxay emily hayjessica de los santos . So [facility] [phone].    ATENCIÓN: Si habla español, tiene a webster disposición servicios gratuitos de asistencia lingüística. Cintiaame al [phone].    We comply with applicable federal civil rights laws and [place] laws. We do not discriminate on the basis of race, color, national origin, age, disability, sex, sexual orientation, or gender identity.            Thank you!     Thank you for choosing [facility] [place]  for your care. Our goal is always to provide you with excellent care. Hearing back from our patients is one way we can continue to improve our services. Please take a few minutes to complete the written survey that you may receive in the mail after your visit with us. Thank you!             Your Updated Medication List - Protect others around you: Learn how to safely use, store " and throw away your medicines at www.disposemymeds.org.          This list is accurate as of 8/10/18 11:38 AM.  Always use your most recent med list.                   Brand Name Dispense Instructions for use Diagnosis    ACETAMINOPHEN PO      Take 1,000 mg by mouth every 8 hours as needed for pain        ESCITALOPRAM OXALATE PO      Take 10 mg by mouth daily        HYDROXYZINE PAMOATE PO      Take 25 mg by mouth every 6 hours as needed for itching        LEVOTHYROXINE SODIUM PO      Take 100 mcg by mouth daily        OXYCODONE HCL PO      Take 5-10 mg by mouth every 3 hours as needed        rivaroxaban ANTICOAGULANT 15 MG Tabs tablet    XARELTO    41 tablet    Take 1 tablet (15 mg) by mouth 2 times daily (with meals)    DVT, lower extremity, distal, acute, left (H)       senna-docusate 8.6-50 MG per tablet    SENOKOT-S;PERICOLACE     Take 2 tablets by mouth daily as needed for constipation        VITAMIN C PO      Take 500 mg by mouth daily        VITAMIN D (CHOLECALCIFEROL) PO      Take 5,000 Units by mouth daily

## 2018-08-10 NOTE — NURSING NOTE
Blood pressure 145/86 pulse 88  Patient denies lightheadedness fatigue or other signs and symptoms related to low hemoglobin.  Patient knows to go downstairs for labs and to get occult stool kit.  Patient given information on obtaining occult stool and where to bring it when completed.Anne Lam, RN, BSN

## 2018-08-10 NOTE — TELEPHONE ENCOUNTER
Patient left message reporting that she is on Xarelto and is now having bloody stools.  She states it started 2 days ago but was not very prominent although today and yesterday she notes it is.  Patient does state that she has a hemorrhoid.  RN spoke with Dr. Navarrete about the above and recommends that patient have a CBC drawn occult stool hold Xarelto until lab results are back and get blood pressure checked for hypotension.  Patient called with recommendation above And is agreeable to coming get labs done at [facility] and have blood pressure checked by RN.   Orders placed as described above  Anne Lam, VEE, BSN

## 2018-08-10 NOTE — TELEPHONE ENCOUNTER
Pt called requesting update on her labs from today and plan going forward, pt is worried about going through the weekend without knowing, routing to Dr. Navarrete.     Pt will bring in stool sample today to lab.     Yenifer Lamar, ERNESTON, RN

## 2018-08-13 NOTE — TELEPHONE ENCOUNTER
See lab note done today.   Called and discussed results with the patient.  See me on 8/15  Hold xarelto  See primary for hemorrhoid eval  Take PPI.

## 2018-08-15 ENCOUNTER — OFFICE VISIT (OUTPATIENT)
Dept: OTHER | Facility: CLINIC | Age: 53
End: 2018-08-15
Attending: FAMILY MEDICINE
Payer: COMMERCIAL

## 2018-08-15 ENCOUNTER — HOSPITAL ENCOUNTER (OUTPATIENT)
Dept: LAB | Facility: CLINIC | Age: 53
Discharge: HOME | End: 2018-08-15
Attending: FAMILY MEDICINE | Admitting: FAMILY MEDICINE
Payer: COMMERCIAL

## 2018-08-15 ENCOUNTER — HOSPITAL ENCOUNTER (OUTPATIENT)
Dept: ULTRASOUND IMAGING | Facility: CLINIC | Age: 53
End: 2018-08-15
Attending: INTERNAL MEDICINE
Payer: COMMERCIAL

## 2018-08-15 VITALS
WEIGHT: 170 LBS | SYSTOLIC BLOOD PRESSURE: 145 MMHG | OXYGEN SATURATION: 98 % | BODY MASS INDEX: 29.18 KG/M2 | DIASTOLIC BLOOD PRESSURE: 90 MMHG | HEART RATE: 103 BPM

## 2018-08-15 DIAGNOSIS — M84.364S STRESS FRACTURE OF LEFT FIBULA, SEQUELA: ICD-10-CM

## 2018-08-15 DIAGNOSIS — I82.412 ACUTE DEEP VEIN THROMBOSIS (DVT) OF FEMORAL VEIN OF LEFT LOWER EXTREMITY (H): ICD-10-CM

## 2018-08-15 DIAGNOSIS — K64.4 EXTERNAL HEMORRHOIDS: ICD-10-CM

## 2018-08-15 DIAGNOSIS — I82.412 ACUTE DEEP VEIN THROMBOSIS (DVT) OF FEMORAL VEIN OF LEFT LOWER EXTREMITY (H): Primary | ICD-10-CM

## 2018-08-15 DIAGNOSIS — K62.5 RECTAL BLEEDING: ICD-10-CM

## 2018-08-15 DIAGNOSIS — I82.409 DVT (DEEP VENOUS THROMBOSIS) (H): ICD-10-CM

## 2018-08-15 LAB — D DIMER PPP FEU-MCNC: 3 UG/ML FEU (ref 0–0.5)

## 2018-08-15 PROCEDURE — 93971 EXTREMITY STUDY: CPT | Mod: LT

## 2018-08-15 PROCEDURE — 99215 OFFICE O/P EST HI 40 MIN: CPT | Mod: ZP | Performed by: INTERNAL MEDICINE

## 2018-08-15 PROCEDURE — G0463 HOSPITAL OUTPT CLINIC VISIT: HCPCS

## 2018-08-15 PROCEDURE — 85379 FIBRIN DEGRADATION QUANT: CPT | Performed by: INTERNAL MEDICINE

## 2018-08-15 PROCEDURE — 36415 COLL VENOUS BLD VENIPUNCTURE: CPT | Performed by: INTERNAL MEDICINE

## 2018-08-15 NOTE — PATIENT INSTRUCTIONS
1. Hold Xarelto for now    2. Go to primary tomorrow for hemorrhoid evaluation( Anoscopy, CBC, stool occult test at primary) get treatment for hemorrhoids.     3. Continue prevacid    4. If hemoglobin stable, no further bleeding will restart Xarelto    5. Repeat Left leg ultrasound today.    If anticoagulation is contraindicated and still significant clot consider IVC filter placement.

## 2018-08-15 NOTE — NURSING NOTE
"Louann Claire is a 52 year old female who presents for:  Chief Complaint   Patient presents with     Consult     New 2 week hospital follow up        Vitals:    Vitals:    08/15/18 1201 08/15/18 1202   BP: 126/88 145/90   BP Location: Right arm Left arm   Patient Position: Chair Chair   Cuff Size: Adult Regular Adult Regular   Pulse: 95 103   SpO2: 98%    Weight: 170 lb (77.1 kg)        BMI:  Estimated body mass index is 29.18 kg/(m^2) as calculated from the following:    Height as of 7/29/18: 5' 4\" (1.626 m).    Weight as of this encounter: 170 lb (77.1 kg).    Pain Score:  Data Unavailable        Beba Looney MA      "

## 2018-08-15 NOTE — PROGRESS NOTES
SUBJECTIVE:  CC:   Follow up  Recent LLE extensive DVT provoked on xarelto stopped 2 days ago due to BRBPR and positive stool occult test  HGB stable  No bleeding last 4 days  Leg feels good NWB for 2 more weeks, uses scooter  Elevated d dimer today 3.0    HPI:   Louann Claire is a 52 year old female recently admitted to [facility] on 7/29/18 with Provoked first lifetime extensive left lower extremity DVT who had been on hormone replacement therapy who presented 11 days post operative from left fibula fracture repair, treated with IV heparin then initiated xarelto 15 bid. She is still NWB uses scooter .  4 days ago she developed bright red blood per rectum positive stool occult test.  CBC was done hemoglobin stable and slightly improved compared to the recent hospitalization.  Xarelto was held since Monday evening  No more bright red blood per rectum  Leg feels better  She has known history of hemorrhoids with intermittent flareup now she has irritation.  She is scheduled to see [facility] physician tomorrow for evaluation of hemorrhoids    Based on her symptoms I asked patient to get D-dimer test before this visit it was elevated 3.0    I also arranged left lower extremity venous duplex after the visit today which I have reviewed and she had a significantly improved clot burden compared to July 29, 2018.  She denies any chest pain, shortness of breath or palpitations    HISTORIES:  PROBLEM LIST:   Patient Active Problem List   Diagnosis     DVT, lower extremity, distal, acute, left (H)     Rectal bleeding     External hemorrhoids      fracture of left fibula, sequela ( s/p fall in july 12th s/p ORIF )      PAST MEDICAL HISTORY:  Past Medical History:   Diagnosis Date     Anxiety      DVT (deep venous thrombosis) (H) 07/29/2018    LLE      Hemorrhoids      Thyroid disease      PAST SURGICAL HISTORY:  Past Surgical History:   Procedure Laterality Date     DILATION AND CURETTAGE  2004     ORTHOPEDIC SURGERY  07/19/2018     Fibula surgery     CURRENT MEDICATIONS:  Current Outpatient Prescriptions   Medication Sig Dispense Refill     ACETAMINOPHEN PO Take 1,000 mg by mouth every 8 hours as needed for pain       Ascorbic Acid (VITAMIN C PO) Take 500 mg by mouth daily       ESCITALOPRAM OXALATE PO Take 10 mg by mouth daily       Lansoprazole (PREVACID PO) Take 15 mg by mouth 2 times daily       LEVOTHYROXINE SODIUM PO Take 100 mcg by mouth daily       rivaroxaban ANTICOAGULANT (XARELTO) 15 MG TABS tablet Take 1 tablet (15 mg) by mouth 2 times daily (with meals) 41 tablet 0     VITAMIN D, CHOLECALCIFEROL, PO Take 5,000 Units by mouth daily       ALLERGIES:  Allergies   Allergen Reactions     Amoxicillin Swelling     Eye swell shut     Sulfa Drugs Rash     SOCIAL HISTORY:  Social History     Social History     Marital status:      Spouse name: N/A     Number of children: N/A     Years of education: N/A     Occupational History     Not on file.     Social History Main Topics     Smoking status: Never Smoker     Smokeless tobacco: Never Used     Alcohol use Yes      Comment: Occassionally     Drug use: No     Sexual activity: Not on file     Other Topics Concern     Not on file     Social History Narrative     FAMILY HISTORY:  Family History   Problem Relation Age of Onset     Cerebrovascular Disease Father      Diabetes Father      REVIEW OF SYSTEMS:  CONSTITUTIONAL:no malaise, fatigue, or other general symptoms  EYES: no subjective changes in visual acuity, no photophobia  ENT/MOUTH: no complaints of rhinorrhea, nasal congestion, sore throat, hearing changes  RESP:no SOB  CV: no c/o exertional chest pressure or SUE  GI: No abdominal pain, constipation, change in bowel movements, nausea, pyrosis, BRBPR  :no polyuria or polydipsia, no dysuria, no gross hematuria  MUSCULOSKELATAL: Recent history of left fibular fracture underwent ORIF wearing the cast and using scooter nonweightbearing  INTEGUMENTARY/SKIN: no pruritis, rashes, or  moles with recent change in size, shape, or pigmentation  NEURO: no gross sensory or motor symptoms, no dizziness, no confusion  ENDOCRINE: no polyuria or polydipsia, no heat or cold intolerance  HEME/ALLERGY/IMMUNE: no fevers, chills, night sweats, or unwanted weight loss  PSYCHIATRIC: no depression, anxiety, or internal stimuli  EXAM:  /90 (BP Location: Left arm, Patient Position: Chair, Cuff Size: Adult Regular)  Pulse 103  Wt 170 lb (77.1 kg)  SpO2 98%  Breastfeeding? No  BMI 29.18 kg/m2  BMI: Body mass index is 29.18 kg/(m^2).  GENERAL APPEARANCE:  Pleasant  Healthy appearing male , alert, active, no distress cooperative.  EXAM:  EYES: clear conjunctiva, no cataracts, no obvious fundoscopic abnormalities  HENT: oropharynx, nares, and TMs are WNL  NECK: no JVD, thyromegaly or lymphadenopathy, no cervical bruits  RESP: clear to auscultation without rales, wheezes, or rhonchi  CV: RRR, no murmurs, gallops, or rubs  LYMPH: no cervical , axillary, or inguinal lymphadenopathy appreciated  GI: NABS, ND/NT, no masses or organomegally appreciated  : normal external genitalia and anus, no lumps, masses  MS: Cast in place.  Using scooter on nonweightbearing left lower extremity  SKIN: no nevi clinically suspicious for malignancy are noted  NEURO: CN II-XII intact, no localizing sensory or motor abnoramlities noted, DTRs symmetrical bilaterally  PSYCH: Mental status exam reveals the pt to have normal mood and affect. There is no disorder of thought form or content. There is no response to internal stimuli. There is no suicidal or homicidal ideation.    VENOUS ULTRASOUND LEFT LEG July 31, 2018 10:01 AM      HISTORY: Evaluated left common femoral vein to make sure no  progression of left leg deep vein thrombosis, on Heparin.      COMPARISON: 7/29/2018.     FINDINGS: Examination of the deep veins with graded compression and  color flow Doppler with spectral wave form analysis was performed,  images show extensive  left lower extremity occlusive deep vein  thrombosis involving the common femoral, femoral, popliteal, posterior  tibial and peroneal veins, unchanged. No progression to the left  external iliac or common iliac veins.     Right common femoral vein was evaluated for comparison and is normal.         IMPRESSION: Extensive deep vein thrombosis throughout the left lower  extremity. No extensive thrombus into the external iliac or common  iliac veins.     SILVIA HERNANDEZ DO    VENOUS ULTRASOUND LEFT LOWER EXTREMITY  8/15/2018 2:35 PM      HISTORY: Acute deep vein thrombosis (DVT) of femoral vein of left  lower extremity (H).     COMPARISON: 7/31/2018     TECHNIQUE: Color Doppler and spectral waveform analysis performed  throughout the deep veins of the left lower extremity.     FINDINGS: The left common femoral, proximal greater saphenous, and  femoral vein segments appear patent with normal blood flow,  compression, and augmentation. Nonocclusive thrombus in the popliteal  vein. Occlusive thrombus extends into the posterior tibial veins  proximally. Both posterior tibial peroneal veins are patent in the  lower calf. Previously, thrombus was identified in the common femoral,  femoral, popliteal, and calf veins.         IMPRESSION:  1. Improvement in overall deep venous thrombus as there is  nonocclusive thrombus in the popliteal vein with extension into the  proximal posterior tibial veins. This is diminished in extent  considerably since previous exam.     RACHANA ZIMMER MD    (I82.409) DVT (deep venous thrombosis) (H) extensive LLE provoked first life time episode 7/29/18  (primary encounter diagnosis)  (K62.5) Rectal bleeding  (K64.4) External hemorrhoids  Comment: This is a very pleasant 52-year-old female developed provoked first lifetime left lower extremity extensive deep venous thrombosis .  She had a history of left fibular fracture underwent open reduction internal fixation nonweightbearing she was also  taking birth control pills prior to the DVT.  She was admitted to the hospital and underwent IV heparin then followed by Xarelto 15 mg twice a day for 21 days  Few days ago she developed bright red blood per rectum and dark stools hemoglobin was stable but stool occult was positive and she has a history of hemorrhoids.  Xarelto was held  Repeat d-dimer today  elevated 3.0  Repeat left lower extremity venous duplex significantly improved clot burden  No respiratory symptoms  Plan;  Hold Xarelto for now  Follow-up with the primary care physician for hemorrhoidal evaluation tomorrow and check CBC same time  Sitz baths high-fiber diet  If no more bright red blood per rectum -2 stool occult test reinitiate Xarelto 15 twice a day for total duration of 21 days then followed by 20 mg daily  Avoid NSAIDs  Continue Prevacid  If recurrent bright red blood per rectum or contraindication for anticoagulation consider IVC filter placement.  Pros and cons discussed with the patient  She is willing to go for IVC filter if needed  RTC 2 weeks            (M84.364S) Fracture of left fibula, sequela ( s/p fall in july 12th s/p ORIF )   Comment: She is  following up with orthopedic surgeon currently wearing brace and using scooter nonweightbearing for 2 more weeks continue the same plan  Plan:     Total patient care time spent today 40 minutes face-to-face and more than 50% of the time spent counseling of the above-mentioned multiple medical issues.  Reviewed recent hospitalization records and reviewed recent labs and imaging study with the patient today.    I have discussed with patient the risks, benefits, medications, treatment options and modalities.   I have instructed the patient to call or schedule a follow-up appointment if any problems or failure to improve.    Alma Navarrete MD,[facility]  Vascular Medicine

## 2018-08-16 ENCOUNTER — TRANSFERRED RECORDS (OUTPATIENT)
Dept: HEALTH INFORMATION MANAGEMENT | Facility: CLINIC | Age: 53
End: 2018-08-16

## 2018-08-17 ENCOUNTER — TELEPHONE (OUTPATIENT)
Dept: OTHER | Facility: CLINIC | Age: 53
End: 2018-08-17

## 2018-08-17 DIAGNOSIS — I82.419 ACUTE DEEP VEIN THROMBOSIS (DVT) OF FEMORAL VEIN, UNSPECIFIED LATERALITY (H): Primary | ICD-10-CM

## 2018-08-17 NOTE — TELEPHONE ENCOUNTER
Patient Lm asking when to restart Jean BAXTER spoke with Estefania Woody PA-C who states that patient needs another occult stool done, if negative ok to restart med  RN spoke with patient who states that she did have one at PCP office and that per her MD was mildly positive and from hemmroids and feels that its ok to restart med but deferring to Dr Rosalba BAXTER routing to Estefania Woody PA-C to call back and advise patient  Anne Lam, VEE, BSN

## 2018-08-17 NOTE — TELEPHONE ENCOUNTER
Called and discussed with patient. No bright red blood per rectum for one week now. Will restart Xarelto. She has 7 days of 15 mg BID left. Prescribed the next step of 20 mg daily with supper to start after completing 15 mg tablets. She was advised to stop the Xarelto immediately and call should she develop any recurrent bleeding.     Estefania Woody PA-C

## 2018-09-05 ENCOUNTER — OFFICE VISIT (OUTPATIENT)
Dept: OTHER | Facility: CLINIC | Age: 53
End: 2018-09-05
Attending: INTERNAL MEDICINE
Payer: COMMERCIAL

## 2018-09-05 ENCOUNTER — HOSPITAL ENCOUNTER (OUTPATIENT)
Dept: LAB | Facility: CLINIC | Age: 53
Discharge: HOME | End: 2018-09-05
Attending: INTERNAL MEDICINE | Admitting: INTERNAL MEDICINE
Payer: COMMERCIAL

## 2018-09-05 ENCOUNTER — TELEPHONE (OUTPATIENT)
Dept: OTHER | Facility: CLINIC | Age: 53
End: 2018-09-05

## 2018-09-05 VITALS
WEIGHT: 170 LBS | OXYGEN SATURATION: 97 % | DIASTOLIC BLOOD PRESSURE: 83 MMHG | SYSTOLIC BLOOD PRESSURE: 133 MMHG | HEART RATE: 91 BPM | BODY MASS INDEX: 29.18 KG/M2

## 2018-09-05 DIAGNOSIS — I82.4Z2 DVT, LOWER EXTREMITY, DISTAL, ACUTE, LEFT (H): Primary | ICD-10-CM

## 2018-09-05 DIAGNOSIS — K64.8 HEMORRHOIDS, INTERNAL: ICD-10-CM

## 2018-09-05 DIAGNOSIS — M84.364S STRESS FRACTURE OF LEFT FIBULA, SEQUELA: ICD-10-CM

## 2018-09-05 LAB
BASOPHILS # BLD AUTO: 0 10E9/L (ref 0–0.2)
BASOPHILS NFR BLD AUTO: 0.5 %
DIFFERENTIAL METHOD BLD: NORMAL
EOSINOPHIL # BLD AUTO: 0.2 10E9/L (ref 0–0.7)
EOSINOPHIL NFR BLD AUTO: 2.7 %
ERYTHROCYTE [DISTWIDTH] IN BLOOD BY AUTOMATED COUNT: 14.5 % (ref 10–15)
HCT VFR BLD AUTO: 40.2 % (ref 35–47)
HGB BLD-MCNC: 13.2 G/DL (ref 11.7–15.7)
IMM GRANULOCYTES # BLD: 0 10E9/L (ref 0–0.4)
IMM GRANULOCYTES NFR BLD: 0.1 %
LYMPHOCYTES # BLD AUTO: 2.5 10E9/L (ref 0.8–5.3)
LYMPHOCYTES NFR BLD AUTO: 34.4 %
MCH RBC QN AUTO: 28.6 PG (ref 26.5–33)
MCHC RBC AUTO-ENTMCNC: 32.8 G/DL (ref 31.5–36.5)
MCV RBC AUTO: 87 FL (ref 78–100)
MONOCYTES # BLD AUTO: 0.7 10E9/L (ref 0–1.3)
MONOCYTES NFR BLD AUTO: 9.1 %
NEUTROPHILS # BLD AUTO: 3.9 10E9/L (ref 1.6–8.3)
NEUTROPHILS NFR BLD AUTO: 53.2 %
NRBC # BLD AUTO: 0 10*3/UL
NRBC BLD AUTO-RTO: 0 /100
PLATELET # BLD AUTO: 385 10E9/L (ref 150–450)
RBC # BLD AUTO: 4.61 10E12/L (ref 3.8–5.2)
WBC # BLD AUTO: 7.4 10E9/L (ref 4–11)

## 2018-09-05 PROCEDURE — 99214 OFFICE O/P EST MOD 30 MIN: CPT | Mod: ZP | Performed by: INTERNAL MEDICINE

## 2018-09-05 PROCEDURE — 36415 COLL VENOUS BLD VENIPUNCTURE: CPT | Performed by: INTERNAL MEDICINE

## 2018-09-05 PROCEDURE — 85025 COMPLETE CBC W/AUTO DIFF WBC: CPT | Performed by: INTERNAL MEDICINE

## 2018-09-05 PROCEDURE — G0463 HOSPITAL OUTPT CLINIC VISIT: HCPCS

## 2018-09-05 NOTE — PROGRESS NOTES
SUBJECTIVE:  CC:   Follow up, recent BRBPR resolved, seen by primary and found small internal hemorrhoid, restarted xarelto on 8/17/18, no more bleeding  Off weight restrictions, ambulating with boot. Less leg swelling   Last visit left LE US improved clot burden.      HPI:   Louann Claire is a 52 year old female recently admitted to [facility] on 7/29/18 with Provoked first lifetime extensive left lower extremity DVT who had been on hormone replacement therapy who presented 11 days post operative from left fibula fracture repair, treated with IV heparin then initiated xarelto 15 bid follwed by 20 mg daily.  Recently  she developed bright red blood per rectum positive stool occult test seen and evaluated by me then primary. Held few days of Xeralto, work up revealed small internal hemorrhoid,  Hemoglobin stable and no bleeding last 2 weeks. She restarted xarelto on 8/17/18.  Leg feels better  Seen ortho no more weight bearing issues, ambulating  She denies any chest pain, shortness of breath or palpitations    Recent d dimer 3.0 and US significant improvement in clot burden.    HISTORIES:  PROBLEM LIST:   Patient Active Problem List   Diagnosis     DVT, lower extremity, distal, acute, left (H)     Rectal bleeding     External hemorrhoids      fracture of left fibula, sequela ( s/p fall in july 12th s/p ORIF )      PAST MEDICAL HISTORY:  Past Medical History:   Diagnosis Date     Anxiety      DVT (deep venous thrombosis) (H) 07/29/2018    LLE      Hemorrhoids      Thyroid disease      PAST SURGICAL HISTORY:  Past Surgical History:   Procedure Laterality Date     DILATION AND CURETTAGE  2004     ORTHOPEDIC SURGERY  07/19/2018    Fibula surgery     CURRENT MEDICATIONS:  Current Outpatient Prescriptions   Medication Sig Dispense Refill     ACETAMINOPHEN PO Take 1,000 mg by mouth every 8 hours as needed for pain       Ascorbic Acid (VITAMIN C PO) Take 500 mg by mouth daily       ESCITALOPRAM OXALATE PO Take 10 mg by mouth  daily       Lansoprazole (PREVACID PO) Take 15 mg by mouth 2 times daily       LEVOTHYROXINE SODIUM PO Take 100 mcg by mouth daily       rivaroxaban ANTICOAGULANT (XARELTO) 20 MG TABS tablet Take 1 tablet (20 mg) by mouth daily (with dinner) 30 tablet 11     VITAMIN D, CHOLECALCIFEROL, PO Take 5,000 Units by mouth daily       [DISCONTINUED] rivaroxaban ANTICOAGULANT (XARELTO) 15 MG TABS tablet Take 1 tablet (15 mg) by mouth 2 times daily (with meals) 41 tablet 0     ALLERGIES:  Allergies   Allergen Reactions     Amoxicillin Swelling     Eye swell shut     Sulfa Drugs Rash     SOCIAL HISTORY:  Social History     Social History     Marital status:      Spouse name: N/A     Number of children: N/A     Years of education: N/A     Occupational History     Not on file.     Social History Main Topics     Smoking status: Never Smoker     Smokeless tobacco: Never Used     Alcohol use Yes      Comment: Occassionally     Drug use: No     Sexual activity: Not on file     Other Topics Concern     Not on file     Social History Narrative     FAMILY HISTORY:  Family History   Problem Relation Age of Onset     Cerebrovascular Disease Father      Diabetes Father      REVIEW OF SYSTEMS:  CONSTITUTIONAL:no malaise, fatigue, or other general symptoms  EYES: no subjective changes in visual acuity, no photophobia  ENT/MOUTH: no complaints of rhinorrhea, nasal congestion, sore throat, hearing changes  RESP:no SOB  CV: no c/o exertional chest pressure or SUE  GI: No abdominal pain, constipation, change in bowel movements, nausea, pyrosis, BRBPR  :no polyuria or polydipsia, no dysuria, no gross hematuria  MUSCULOSKELATAL: Recent history of left fibular fracture underwent ORIF wearing the cast and using scooter nonweightbearing  INTEGUMENTARY/SKIN: no pruritis, rashes, or moles with recent change in size, shape, or pigmentation  NEURO: no gross sensory or motor symptoms, no dizziness, no confusion  ENDOCRINE: no polyuria or  polydipsia, no heat or cold intolerance  HEME/ALLERGY/IMMUNE: no fevers, chills, night sweats, or unwanted weight loss  PSYCHIATRIC: no depression, anxiety, or internal stimuli  EXAM:  /83 (BP Location: Right arm, Patient Position: Chair, Cuff Size: Adult Large)  Pulse 91  Wt 170 lb (77.1 kg)  SpO2 97%  Breastfeeding? No  BMI 29.18 kg/m2  BMI: Body mass index is 29.18 kg/(m^2).  GENERAL APPEARANCE:  Pleasant  Healthy appearing male , alert, active, no distress cooperative.  EXAM:  EYES: clear conjunctiva, no cataracts, no obvious fundoscopic abnormalities  HENT: oropharynx, nares, and TMs are WNL  NECK: no JVD, thyromegaly or lymphadenopathy, no cervical bruits  RESP: clear to auscultation without rales, wheezes, or rhonchi  CV: RRR, no murmurs, gallops, or rubs  LYMPH: no cervical , axillary, or inguinal lymphadenopathy appreciated  GI: NABS, ND/NT, no masses or organomegally appreciated  : normal external genitalia and anus, no lumps, masses  MS: Cast in place.  Using scooter on nonweightbearing left lower extremity  SKIN: no nevi clinically suspicious for malignancy are noted  NEURO: CN II-XII intact, no localizing sensory or motor abnoramlities noted, DTRs symmetrical bilaterally  PSYCH: Mental status exam reveals the pt to have normal mood and affect. There is no disorder of thought form or content. There is no response to internal stimuli. There is no suicidal or homicidal ideation.    VENOUS ULTRASOUND LEFT LEG July 31, 2018 10:01 AM      HISTORY: Evaluated left common femoral vein to make sure no  progression of left leg deep vein thrombosis, on Heparin.      COMPARISON: 7/29/2018.     FINDINGS: Examination of the deep veins with graded compression and  color flow Doppler with spectral wave form analysis was performed,  images show extensive left lower extremity occlusive deep vein  thrombosis involving the common femoral, femoral, popliteal, posterior  tibial and peroneal veins, unchanged. No  progression to the left  external iliac or common iliac veins.     Right common femoral vein was evaluated for comparison and is normal.         IMPRESSION: Extensive deep vein thrombosis throughout the left lower  extremity. No extensive thrombus into the external iliac or common  iliac veins.     SILVIA HERNANDEZ DO    VENOUS ULTRASOUND LEFT LOWER EXTREMITY  8/15/2018 2:35 PM      HISTORY: Acute deep vein thrombosis (DVT) of femoral vein of left  lower extremity (H).     COMPARISON: 7/31/2018     TECHNIQUE: Color Doppler and spectral waveform analysis performed  throughout the deep veins of the left lower extremity.     FINDINGS: The left common femoral, proximal greater saphenous, and  femoral vein segments appear patent with normal blood flow,  compression, and augmentation. Nonocclusive thrombus in the popliteal  vein. Occlusive thrombus extends into the posterior tibial veins  proximally. Both posterior tibial peroneal veins are patent in the  lower calf. Previously, thrombus was identified in the common femoral,  femoral, popliteal, and calf veins.         IMPRESSION:  1. Improvement in overall deep venous thrombus as there is  nonocclusive thrombus in the popliteal vein with extension into the  proximal posterior tibial veins. This is diminished in extent  considerably since previous exam.     RACHANA ZIMMER MD    (I82.409) DVT (deep venous thrombosis) (H) extensive LLE provoked first life time episode 7/29/18  (primary encounter diagnosis)  (K62.5) Rectal bleeding, resolved  (K64.4) Internal  Hemorrhoids, stable and no more flare up or bleeding  Comment: This is a very pleasant 52-year-old female developed provoked first lifetime left lower extremity extensive deep venous thrombosis .  She had a history of left fibular fracture underwent open reduction internal fixation nonweightbearing she was also taking birth control pills prior to the DVT.  She was admitted to the hospital and underwent IV heparin  then followed by Xarelto 15 mg twice a day for 21 days  Few days ago she developed bright red blood per rectum and dark stools hemoglobin was stable but stool occult was positive and she has a history of hemorrhoids.  Xarelto was held for 4 days then restarted on 8/17/18  Repeat d-dimer today  elevated 3.0  Repeat left lower extremity venous duplex significantly improved clot burden  No respiratory symptoms  NO MORE BLEED SINCE LAST VISIT, SEEN PRIMARY , RESTARTED XARELTO 8/17 AND TOLERATING  LEG FEELS GOOD    Plan;  Continue Xarelto 20 mg daily and duration will be determined at her next visit  CBC today  Sitz baths high-fiber diet  Avoid NSAIDs  Continue Prevacid  Pros and cons discussed with the patient  Utilize thigh high compression stockings  RTC 2 months         (M84.364S) Fracture of left fibula, sequela ( s/p fall in july 12th s/p ORIF )   management per ortho    Total patient care time spent today 25 minutes face-to-face and more than 50% of the time spent counseling of the above-mentioned multiple medical issues.  Reviewed recent hospitalization records and reviewed recent labs and imaging study with the patient today.    I have discussed with patient the risks, benefits, medications, treatment options and modalities.   I have instructed the patient to call or schedule a follow-up appointment if any problems or failure to improve.    Alma Navarrete MD,[facility]  Vascular Medicine

## 2018-09-05 NOTE — TELEPHONE ENCOUNTER
Left message for patient to return our call to schedule a follow up for 2 month with Dr. Navarrete.    Camila Andrews MA

## 2018-09-05 NOTE — NURSING NOTE
"Louann Claire is a 52 year old female who presents for:  Chief Complaint   Patient presents with     RECHECK     Follow up Venous comp done 8/15/18.         Vitals:    Vitals:    09/05/18 0934   BP: 133/83   BP Location: Right arm   Patient Position: Chair   Cuff Size: Adult Large   Pulse: 91   SpO2: 97%   Weight: 170 lb (77.1 kg)       BMI:  Estimated body mass index is 29.18 kg/(m^2) as calculated from the following:    Height as of 7/29/18: 5' 4\" (1.626 m).    Weight as of this encounter: 170 lb (77.1 kg).    Pain Score:  Data Unavailable        Beba Looney MA      "

## 2018-09-05 NOTE — PATIENT INSTRUCTIONS
Continue xarelto 20 mg daily    Go for CBC today    Watch for bleeding and call our clinic or go to ER if any signs of rectal bleeding    Continue hemorrhoids recommendations given by primary    See me in 2 months.

## 2018-09-05 NOTE — MR AVS SNAPSHOT
After Visit Summary   9/5/2018    Louann Claire    MRN: [id number]           Patient Information     Date Of Birth          1965        Visit Information        Provider Department      9/5/2018 9:30 AM Alma Navarrete MD [facility]      Today's Diagnoses     DVT, lower extremity, distal, acute, left (H)    -  1    Hemorrhoids, internal         fracture of left fibula, sequela ( s/p fall in july 12th s/p ORIF )           Care Instructions    Continue xarelto 20 mg daily    Go for CBC today    Watch for bleeding and call our clinic or go to ER if any signs of rectal bleeding    Continue hemorrhoids recommendations given by primary    See me in 2 months.          Follow-ups after your visit        Follow-up notes from your care team     Return in about 2 months (around 11/5/2018).      Future tests that were ordered for you today     Open Future Orders        Priority Expected Expires Ordered    CBC with platelets differential Routine 9/5/2018 9/5/2019 9/5/2018            Who to contact     If you have questions or need follow up information about today's clinic visit or your schedule please contact [facility] directly at [phone].  Normal or non-critical lab and imaging results will be communicated to you by MyChart, letter or phone within 4 business days after the clinic has received the results. If you do not hear from us within 7 days, please contact the clinic through MyChart or phone. If you have a critical or abnormal lab result, we will notify you by phone as soon as possible.  Submit refill requests through [employer] or call your pharmacy and they will forward the refill request to us. Please allow 3 business days for your refill to be completed.          Additional Information About Your Visit        [employer] Information     [employer] gives you secure access to your electronic health  record. If you see a primary care provider, you can also send messages to your care team and make appointments. If you have questions, please call your primary care clinic.  If you do not have a primary care provider, please call [phone] and they will assist you.        Care EveryWhere ID     This is your Care EveryWhere ID. This could be used by other organizations to access your [place] medical records  [id number]        Your Vitals Were     Pulse Pulse Oximetry Breastfeeding? BMI (Body Mass Index)          91 97% No 29.18 kg/m2         Blood Pressure from Last 3 Encounters:   09/05/18 133/83   08/15/18 145/90   07/31/18 123/77    Weight from Last 3 Encounters:   09/05/18 170 lb (77.1 kg)   08/15/18 170 lb (77.1 kg)   07/29/18 175 lb (79.4 kg)               Primary Care Provider Office Phone # Fax #    Eve Stevenson [phone]       [facility] [address]  [facility] [address]        Equal Access to Services     [facility]: Hadii aad ku hadasho Soomaali, waaxda luqadaha, qaybta kaalmada adeegyada, waxay idiin hayjessica de los santos . So [facility] [phone].    ATENCIÓN: Si habla español, tiene a webster disposición servicios gratuitos de asistencia lingüística. Llame al [phone].    We comply with applicable federal civil rights laws and [place] laws. We do not discriminate on the basis of race, color, national origin, age, disability, sex, sexual orientation, or gender identity.            Thank you!     Thank you for choosing [facility] [place]  for your care. Our goal is always to provide you with excellent care. Hearing back from our patients is one way we can continue to improve our services. Please take a few minutes to complete the written survey that you may receive in the mail after your visit with us. Thank you!             Your Updated Medication List - Protect others around you: Learn how to safely use, store and throw away your medicines at  www.disposemymeds.org.          This list is accurate as of 9/5/18 10:05 AM.  Always use your most recent med list.                   Brand Name Dispense Instructions for use Diagnosis    ACETAMINOPHEN PO      Take 1,000 mg by mouth every 8 hours as needed for pain        ESCITALOPRAM OXALATE PO      Take 10 mg by mouth daily        LEVOTHYROXINE SODIUM PO      Take 100 mcg by mouth daily        PREVACID PO      Take 15 mg by mouth 2 times daily    Rectal bleeding       rivaroxaban ANTICOAGULANT 20 MG Tabs tablet    XARELTO    30 tablet    Take 1 tablet (20 mg) by mouth daily (with dinner)    Acute deep vein thrombosis (DVT) of femoral vein, unspecified laterality (H)       VITAMIN C PO      Take 500 mg by mouth daily        VITAMIN D (CHOLECALCIFEROL) PO      Take 5,000 Units by mouth daily

## 2018-09-10 ENCOUNTER — MYC MEDICAL ADVICE (OUTPATIENT)
Dept: OTHER | Facility: CLINIC | Age: 53
End: 2018-09-10

## 2018-10-29 NOTE — TELEPHONE ENCOUNTER
Patient would like to know if she needs US or labs before OV on Thursday visit?  Please advise  Anne Lam, RN, BSN

## 2018-11-01 ENCOUNTER — OFFICE VISIT (OUTPATIENT)
Dept: OTHER | Facility: CLINIC | Age: 53
End: 2018-11-01
Attending: INTERNAL MEDICINE
Payer: COMMERCIAL

## 2018-11-01 VITALS
BODY MASS INDEX: 31.24 KG/M2 | DIASTOLIC BLOOD PRESSURE: 85 MMHG | WEIGHT: 182 LBS | SYSTOLIC BLOOD PRESSURE: 131 MMHG | HEART RATE: 95 BPM | OXYGEN SATURATION: 96 %

## 2018-11-01 DIAGNOSIS — I82.4Z2 DVT, LOWER EXTREMITY, DISTAL, ACUTE, LEFT (H): Primary | ICD-10-CM

## 2018-11-01 DIAGNOSIS — K64.4 EXTERNAL HEMORRHOIDS: ICD-10-CM

## 2018-11-01 DIAGNOSIS — M84.364S STRESS FRACTURE OF LEFT FIBULA, SEQUELA: ICD-10-CM

## 2018-11-01 PROCEDURE — 99214 OFFICE O/P EST MOD 30 MIN: CPT | Mod: ZP | Performed by: INTERNAL MEDICINE

## 2018-11-01 PROCEDURE — G0463 HOSPITAL OUTPT CLINIC VISIT: HCPCS

## 2018-11-01 NOTE — PROGRESS NOTES
SUBJECTIVE:  CC:   Follow up, no more rectal bleeding  Tolerating Xarelto  Off weight restrictions, ambulating , resolved leg swelling  Last visit left LE US improved clot burden.      HPI:   Louann Claire is a 52 year old female recently admitted to [facility] on 7/29/18 with Provoked first lifetime extensive left lower extremity DVT who had been on hormone replacement therapy who presented 11 days post operative from left fibula fracture repair, treated with IV heparin then initiated xarelto 15 bid follwed by 20 mg daily.  Recently  she developed bright red blood per rectum positive stool occult test seen and evaluated by me then primary. Held few days of Xeralto, work up revealed small internal hemorrhoid,  Hemoglobin stable and no bleeding last 2 weeks. She restarted xarelto on 8/17/18.  No more rectal bleeding  Leg feels better  Seen ortho no more weight bearing issues, ambulating  She denies any chest pain, shortness of breath or palpitations    Last visit d dimer 3.0 and US significant improvement in clot burden.    HISTORIES:  PROBLEM LIST:   Patient Active Problem List   Diagnosis     DVT, lower extremity, distal, acute, left (H)     Rectal bleeding     External hemorrhoids      fracture of left fibula, sequela ( s/p fall in july 12th s/p ORIF )      PAST MEDICAL HISTORY:  Past Medical History:   Diagnosis Date     Anxiety      DVT (deep venous thrombosis) (H) 07/29/2018    LLE      Hemorrhoids      Thyroid disease      PAST SURGICAL HISTORY:  Past Surgical History:   Procedure Laterality Date     DILATION AND CURETTAGE  2004     ORTHOPEDIC SURGERY  07/19/2018    Fibula surgery     CURRENT MEDICATIONS:  Current Outpatient Prescriptions   Medication Sig Dispense Refill     ACETAMINOPHEN PO Take 1,000 mg by mouth every 8 hours as needed for pain       Ascorbic Acid (VITAMIN C PO) Take 500 mg by mouth daily       ESCITALOPRAM OXALATE PO Take 10 mg by mouth daily       LEVOTHYROXINE SODIUM PO Take 100 mcg by  mouth daily       rivaroxaban ANTICOAGULANT (XARELTO) 20 MG TABS tablet Take 1 tablet (20 mg) by mouth daily (with dinner) 30 tablet 11     VITAMIN D, CHOLECALCIFEROL, PO Take 5,000 Units by mouth daily       Lansoprazole (PREVACID PO) Take 15 mg by mouth 2 times daily       ALLERGIES:  Allergies   Allergen Reactions     Amoxicillin Swelling     Eye swell shut     Sulfa Drugs Rash     SOCIAL HISTORY:  Social History     Social History     Marital status:      Spouse name: N/A     Number of children: N/A     Years of education: N/A     Occupational History     Not on file.     Social History Main Topics     Smoking status: Never Smoker     Smokeless tobacco: Never Used     Alcohol use Yes      Comment: Occassionally     Drug use: No     Sexual activity: Not on file     Other Topics Concern     Not on file     Social History Narrative     FAMILY HISTORY:  Family History   Problem Relation Age of Onset     Cerebrovascular Disease Father      Diabetes Father      REVIEW OF SYSTEMS:  CONSTITUTIONAL:no malaise, fatigue, or other general symptoms  EYES: no subjective changes in visual acuity, no photophobia  ENT/MOUTH: no complaints of rhinorrhea, nasal congestion, sore throat, hearing changes  RESP:no SOB  CV: no c/o exertional chest pressure or SUE  GI: No abdominal pain, constipation, change in bowel movements, nausea, pyrosis, BRBPR  :no polyuria or polydipsia, no dysuria, no gross hematuria  MUSCULOSKELATAL: Recent history of left fibular fracture underwent ORIF wearing the cast and using scooter nonweightbearing  INTEGUMENTARY/SKIN: no pruritis, rashes, or moles with recent change in size, shape, or pigmentation  NEURO: no gross sensory or motor symptoms, no dizziness, no confusion  ENDOCRINE: no polyuria or polydipsia, no heat or cold intolerance  HEME/ALLERGY/IMMUNE: no fevers, chills, night sweats, or unwanted weight loss  PSYCHIATRIC: no depression, anxiety, or internal stimuli  EXAM:  /85 (BP  Location: Right arm, Patient Position: Sitting, Cuff Size: Adult Regular)  Pulse 95  Wt 182 lb (82.6 kg)  SpO2 96%  Breastfeeding? No  BMI 31.24 kg/m2  BMI: Body mass index is 31.24 kg/(m^2).  GENERAL APPEARANCE:  Pleasant  Healthy appearing male , alert, active, no distress cooperative.  EXAM:  EYES: clear conjunctiva, no cataracts, no obvious fundoscopic abnormalities  HENT: oropharynx, nares, and TMs are WNL  NECK: no JVD, thyromegaly or lymphadenopathy, no cervical bruits  RESP: clear to auscultation without rales, wheezes, or rhonchi  CV: RRR, no murmurs, gallops, or rubs  LYMPH: no cervical , axillary, or inguinal lymphadenopathy appreciated  GI: NABS, ND/NT, no masses or organomegally appreciated  : normal external genitalia and anus, no lumps, masses  MS: Cast in place.  Using scooter on nonweightbearing left lower extremity  SKIN: no nevi clinically suspicious for malignancy are noted  NEURO: CN II-XII intact, no localizing sensory or motor abnoramlities noted, DTRs symmetrical bilaterally  PSYCH: Mental status exam reveals the pt to have normal mood and affect. There is no disorder of thought form or content. There is no response to internal stimuli. There is no suicidal or homicidal ideation.    VENOUS ULTRASOUND LEFT LEG July 31, 2018 10:01 AM      HISTORY: Evaluated left common femoral vein to make sure no  progression of left leg deep vein thrombosis, on Heparin.      COMPARISON: 7/29/2018.     FINDINGS: Examination of the deep veins with graded compression and  color flow Doppler with spectral wave form analysis was performed,  images show extensive left lower extremity occlusive deep vein  thrombosis involving the common femoral, femoral, popliteal, posterior  tibial and peroneal veins, unchanged. No progression to the left  external iliac or common iliac veins.     Right common femoral vein was evaluated for comparison and is normal.         IMPRESSION: Extensive deep vein thrombosis  throughout the left lower  extremity. No extensive thrombus into the external iliac or common  iliac veins.     SILVIA HERNANDEZ DO    VENOUS ULTRASOUND LEFT LOWER EXTREMITY  8/15/2018 2:35 PM      HISTORY: Acute deep vein thrombosis (DVT) of femoral vein of left  lower extremity (H).     COMPARISON: 7/31/2018     TECHNIQUE: Color Doppler and spectral waveform analysis performed  throughout the deep veins of the left lower extremity.     FINDINGS: The left common femoral, proximal greater saphenous, and  femoral vein segments appear patent with normal blood flow,  compression, and augmentation. Nonocclusive thrombus in the popliteal  vein. Occlusive thrombus extends into the posterior tibial veins  proximally. Both posterior tibial peroneal veins are patent in the  lower calf. Previously, thrombus was identified in the common femoral,  femoral, popliteal, and calf veins.         IMPRESSION:  1. Improvement in overall deep venous thrombus as there is  nonocclusive thrombus in the popliteal vein with extension into the  proximal posterior tibial veins. This is diminished in extent  considerably since previous exam.     RACHANA ZIMMER MD    (I82.409) DVT (deep venous thrombosis) (H) extensive LLE provoked first life time episode 7/29/18  (primary encounter diagnosis) Immobilization, BCP related.  (K64.4) Internal  Hemorrhoids,  No more bleeding  Comment: This is a very pleasant 52-year-old female developed provoked first lifetime left lower extremity extensive deep venous thrombosis .  She had a history of left fibular fracture underwent open reduction internal fixation nonweightbearing she was also taking birth control pills prior to the DVT.  She was admitted to the hospital and underwent IV heparin then followed by Xarelto 15 mg twice a day for 21 days  Recent  left lower extremity venous duplex significantly improved clot burden  No respiratory symptoms  Feeling bettter    Plan;  Continue Xarelto 20 mg daily and  duration approximately 6 month total based on d dimer etc.  Check D dimer and repeat LLE venous duplex in 6 weeks then see me.         (M84.364S) Fracture of left fibula, sequela ( s/p fall in july 12th s/p ORIF )   management per ortho    Total patient care time spent today 25 minutes face-to-face and more than 50% of the time spent counseling of the above-mentioned multiple medical issues.  Reviewed recent hospitalization records and reviewed recent labs and imaging study with the patient today.    I have discussed with patient the risks, benefits, medications, treatment options and modalities.   I have instructed the patient to call or schedule a follow-up appointment if any problems or failure to improve.    Alma Navarrete MD,FS,[facility]  Vascular Medicine

## 2018-11-01 NOTE — PATIENT INSTRUCTIONS
Check D dimer and left leg US in 6 weeks  then we will decide duration of anticoagulation.    Continue xarelto and compression stockings etc.

## 2018-11-01 NOTE — NURSING NOTE
"Louann Claire is a 53 year old female who presents for:  Chief Complaint   Patient presents with     RECHECK     Follow up DVT        Vitals:    Vitals:    11/01/18 1433   BP: 131/85   BP Location: Right arm   Patient Position: Sitting   Cuff Size: Adult Regular   Pulse: 95   SpO2: 96%   Weight: 182 lb (82.6 kg)       BMI:  Estimated body mass index is 31.24 kg/(m^2) as calculated from the following:    Height as of 7/29/18: 5' 4\" (1.626 m).    Weight as of this encounter: 182 lb (82.6 kg).    Pain Score:  Data Unavailable        Camila Andrews      "

## 2018-11-01 NOTE — MR AVS SNAPSHOT
After Visit Summary   11/1/2018    Louann Clarie    MRN: [id number]           Patient Information     Date Of Birth          1965        Visit Information        Provider Department      11/1/2018 2:00 PM Alma Navarrete MD [facility]      Today's Diagnoses     DVT, lower extremity, distal, acute, left (H)    -  1     fracture of left fibula, sequela ( s/p fall in july 12th s/p ORIF )         External hemorrhoids          Care Instructions    Check D dimer and left leg US in 6 weeks  then we will decide duration of anticoagulation.    Continue xarelto and compression stockings etc.              Follow-ups after your visit        Follow-up notes from your care team     Return in about 7 weeks (around 12/20/2018).      Who to contact     If you have questions or need follow up information about today's clinic visit or your schedule please contact [facility] directly at [phone].  Normal or non-critical lab and imaging results will be communicated to you by [employer], letter or phone within 4 business days after the clinic has received the results. If you do not hear from us within 7 days, please contact the clinic through [employer] or phone. If you have a critical or abnormal lab result, we will notify you by phone as soon as possible.  Submit refill requests through [employer] or call your pharmacy and they will forward the refill request to us. Please allow 3 business days for your refill to be completed.          Additional Information About Your Visit        [employer] Information     [employer] gives you secure access to your electronic health record. If you see a primary care provider, you can also send messages to your care team and make appointments. If you have questions, please call your primary care clinic.  If you do not have a primary care provider, please call [phone] and they will  assist you.        Care EveryWhere ID     This is your Care EveryWhere ID. This could be used by other organizations to access your [place] medical records  [id number]        Your Vitals Were     Pulse Pulse Oximetry Breastfeeding? BMI (Body Mass Index)          95 96% No 31.24 kg/m2         Blood Pressure from Last 3 Encounters:   11/01/18 131/85   09/05/18 133/83   08/15/18 145/90    Weight from Last 3 Encounters:   11/01/18 182 lb (82.6 kg)   09/05/18 170 lb (77.1 kg)   08/15/18 170 lb (77.1 kg)              Today, you had the following     No orders found for display       Primary Care Provider Office Phone # Fax #    Eve Stevenson [phone]       [facility] [address]  [facility] [address]        Equal Access to Services     REBA ALAMO : Hadii lori olmos hadasho Soomaali, waaxda luqadaha, qaybta kaalmada adeaminayailene, michaelle de los santos . So [facility] [phone].    ATENCIÓN: Si habla español, tiene a webster disposición servicios gratuitos de asistencia lingüística. Abiel al [phone].    We comply with applicable federal civil rights laws and [place] laws. We do not discriminate on the basis of race, color, national origin, age, disability, sex, sexual orientation, or gender identity.            Thank you!     Thank you for choosing [facility] [place]  for your care. Our goal is always to provide you with excellent care. Hearing back from our patients is one way we can continue to improve our services. Please take a few minutes to complete the written survey that you may receive in the mail after your visit with us. Thank you!             Your Updated Medication List - Protect others around you: Learn how to safely use, store and throw away your medicines at www.disposemymeds.org.          This list is accurate as of 11/1/18  2:51 PM.  Always use your most recent med list.                   Brand Name Dispense Instructions for use Diagnosis     ACETAMINOPHEN PO      Take 1,000 mg by mouth every 8 hours as needed for pain        ESCITALOPRAM OXALATE PO      Take 10 mg by mouth daily        LEVOTHYROXINE SODIUM PO      Take 100 mcg by mouth daily        PREVACID PO      Take 15 mg by mouth 2 times daily    Rectal bleeding       rivaroxaban ANTICOAGULANT 20 MG Tabs tablet    XARELTO    30 tablet    Take 1 tablet (20 mg) by mouth daily (with dinner)    Acute deep vein thrombosis (DVT) of femoral vein, unspecified laterality (H)       VITAMIN C PO      Take 500 mg by mouth daily        VITAMIN D (CHOLECALCIFEROL) PO      Take 5,000 Units by mouth daily

## 2018-12-10 ENCOUNTER — TELEPHONE (OUTPATIENT)
Dept: OTHER | Facility: CLINIC | Age: 53
End: 2018-12-10

## 2018-12-10 NOTE — TELEPHONE ENCOUNTER
Left voice message for patient confirming ultrasound, lab due before clinic visit on 12/13/2018.  Beba Looney MA

## 2018-12-13 ENCOUNTER — OFFICE VISIT (OUTPATIENT)
Dept: OTHER | Facility: CLINIC | Age: 53
End: 2018-12-13
Attending: INTERNAL MEDICINE
Payer: COMMERCIAL

## 2018-12-13 ENCOUNTER — HOSPITAL ENCOUNTER (OUTPATIENT)
Dept: ULTRASOUND IMAGING | Facility: CLINIC | Age: 53
Discharge: HOME | End: 2018-12-13
Attending: INTERNAL MEDICINE | Admitting: INTERNAL MEDICINE
Payer: COMMERCIAL

## 2018-12-13 ENCOUNTER — HOSPITAL ENCOUNTER (OUTPATIENT)
Dept: LAB | Facility: CLINIC | Age: 53
End: 2018-12-13
Attending: INTERNAL MEDICINE
Payer: COMMERCIAL

## 2018-12-13 VITALS
SYSTOLIC BLOOD PRESSURE: 138 MMHG | HEART RATE: 87 BPM | DIASTOLIC BLOOD PRESSURE: 84 MMHG | BODY MASS INDEX: 30.62 KG/M2 | WEIGHT: 178.4 LBS | OXYGEN SATURATION: 97 %

## 2018-12-13 DIAGNOSIS — M84.364S STRESS FRACTURE OF LEFT FIBULA, SEQUELA: ICD-10-CM

## 2018-12-13 DIAGNOSIS — I82.4Z2 DVT, LOWER EXTREMITY, DISTAL, ACUTE, LEFT (H): ICD-10-CM

## 2018-12-13 LAB — D DIMER PPP FEU-MCNC: <0.3 UG/ML FEU (ref 0–0.5)

## 2018-12-13 PROCEDURE — 99214 OFFICE O/P EST MOD 30 MIN: CPT | Mod: ZP | Performed by: INTERNAL MEDICINE

## 2018-12-13 PROCEDURE — 93971 EXTREMITY STUDY: CPT | Mod: LT

## 2018-12-13 PROCEDURE — G0463 HOSPITAL OUTPT CLINIC VISIT: HCPCS | Mod: 25

## 2018-12-13 PROCEDURE — 36415 COLL VENOUS BLD VENIPUNCTURE: CPT | Performed by: INTERNAL MEDICINE

## 2018-12-13 PROCEDURE — 85379 FIBRIN DEGRADATION QUANT: CPT | Performed by: INTERNAL MEDICINE

## 2018-12-13 NOTE — PROGRESS NOTES
SUBJECTIVE:  CC:   Follow up, no more rectal bleeding  Tolerating Xarelto  Off weight restrictions, ambulating , resolved leg swelling  Left leg ultrasound today resolved the DVT  D-dimer is normal    HPI:   Louann Claire is a 53 year old female recently admitted to [facility] on 7/29/18 with Provoked first lifetime extensive left lower extremity DVT who had been on hormone replacement therapy who presented 11 days post operative from left fibula fracture repair, treated with IV heparin then initiated xarelto 15 bid follwed by 20 mg daily.  Recently  she developed bright red blood per rectum positive stool occult test seen and evaluated by me then primary. Held few days of Xeralto, work up revealed small internal hemorrhoid,  Hemoglobin stable and no bleeding for the last few months she restarted xarelto on 8/17/18.  No more rectal bleeding  Leg feels better  Seen ortho no more weight bearing issues, ambulating  She denies any chest pain, shortness of breath or palpitations    Last visit d dimer 3.0 and today normal and today ultrasound resolved  Reviewed results with the patient    HISTORIES:  PROBLEM LIST:   Patient Active Problem List   Diagnosis     DVT, lower extremity, distal, acute, left (H)     Rectal bleeding     External hemorrhoids      fracture of left fibula, sequela ( s/p fall in july 12th s/p ORIF )      PAST MEDICAL HISTORY:  Past Medical History:   Diagnosis Date     Anxiety      DVT (deep venous thrombosis) (H) 07/29/2018    LLE      Hemorrhoids      Thyroid disease      PAST SURGICAL HISTORY:  Past Surgical History:   Procedure Laterality Date     DILATION AND CURETTAGE  2004     ORTHOPEDIC SURGERY  07/19/2018    Fibula surgery     CURRENT MEDICATIONS:  Current Outpatient Medications   Medication Sig Dispense Refill     Ascorbic Acid (VITAMIN C PO) Take 500 mg by mouth daily       ESCITALOPRAM OXALATE PO Take 10 mg by mouth daily       LEVOTHYROXINE SODIUM PO Take 100 mcg by mouth daily        rivaroxaban ANTICOAGULANT (XARELTO) 20 MG TABS tablet Take 1 tablet (20 mg) by mouth daily (with dinner) 30 tablet 11     VITAMIN D, CHOLECALCIFEROL, PO Take 5,000 Units by mouth daily       ACETAMINOPHEN PO Take 1,000 mg by mouth every 8 hours as needed for pain       Lansoprazole (PREVACID PO) Take 15 mg by mouth 2 times daily       ALLERGIES:  Allergies   Allergen Reactions     Amoxicillin Swelling     Eye swell shut     Sulfa Drugs Rash     SOCIAL HISTORY:  Social History     Socioeconomic History     Marital status:      Spouse name: Not on file     Number of children: Not on file     Years of education: Not on file     Highest education level: Not on file   Social Needs     Financial resource strain: Not on file     Food insecurity - worry: Not on file     Food insecurity - inability: Not on file     Transportation needs - medical: Not on file     Transportation needs - non-medical: Not on file   Occupational History     Not on file   Tobacco Use     Smoking status: Never Smoker     Smokeless tobacco: Never Used   Substance and Sexual Activity     Alcohol use: Yes     Comment: Occassionally     Drug use: No     Sexual activity: Not on file   Other Topics Concern     Not on file   Social History Narrative     Not on file     FAMILY HISTORY:  Family History   Problem Relation Age of Onset     Cerebrovascular Disease Father      Diabetes Father      REVIEW OF SYSTEMS:  CONSTITUTIONAL:no malaise, fatigue, or other general symptoms  EYES: no subjective changes in visual acuity, no photophobia  ENT/MOUTH: no complaints of rhinorrhea, nasal congestion, sore throat, hearing changes  RESP:no SOB  CV: no c/o exertional chest pressure or SUE  GI: No abdominal pain, constipation, change in bowel movements, nausea, pyrosis, BRBPR  :no polyuria or polydipsia, no dysuria, no gross hematuria  MUSCULOSKELATAL: Recent history of left fibular fracture underwent ORIF wearing the cast and using scooter  nonweightbearing  INTEGUMENTARY/SKIN: no pruritis, rashes, or moles with recent change in size, shape, or pigmentation  NEURO: no gross sensory or motor symptoms, no dizziness, no confusion  ENDOCRINE: no polyuria or polydipsia, no heat or cold intolerance  HEME/ALLERGY/IMMUNE: no fevers, chills, night sweats, or unwanted weight loss  PSYCHIATRIC: no depression, anxiety, or internal stimuli  EXAM:  /84 (BP Location: Right arm, Patient Position: Chair, Cuff Size: Adult Regular)   Pulse 87   Wt 178 lb 6.4 oz (80.9 kg)   SpO2 97%   BMI 30.62 kg/m    BMI: Body mass index is 30.62 kg/m .  GENERAL APPEARANCE:  Pleasant  Healthy appearing male , alert, active, no distress cooperative.  EXAM:  EYES: clear conjunctiva, no cataracts, no obvious fundoscopic abnormalities  HENT: oropharynx, nares, and TMs are WNL  NECK: no JVD, thyromegaly or lymphadenopathy, no cervical bruits  RESP: clear to auscultation without rales, wheezes, or rhonchi  CV: RRR, no murmurs, gallops, or rubs  LYMPH: no cervical , axillary, or inguinal lymphadenopathy appreciated  GI: NABS, ND/NT, no masses or organomegally appreciated  : normal external genitalia and anus, no lumps, masses  MS: Cast in place.  Using scooter on nonweightbearing left lower extremity  SKIN: no nevi clinically suspicious for malignancy are noted  NEURO: CN II-XII intact, no localizing sensory or motor abnoramlities noted, DTRs symmetrical bilaterally  PSYCH: Mental status exam reveals the pt to have normal mood and affect. There is no disorder of thought form or content. There is no response to internal stimuli. There is no suicidal or homicidal ideation.    VENOUS ULTRASOUND LEFT LEG July 31, 2018 10:01 AM      HISTORY: Evaluated left common femoral vein to make sure no  progression of left leg deep vein thrombosis, on Heparin.      COMPARISON: 7/29/2018.     FINDINGS: Examination of the deep veins with graded compression and  color flow Doppler with spectral wave  form analysis was performed,  images show extensive left lower extremity occlusive deep vein  thrombosis involving the common femoral, femoral, popliteal, posterior  tibial and peroneal veins, unchanged. No progression to the left  external iliac or common iliac veins.     Right common femoral vein was evaluated for comparison and is normal.         IMPRESSION: Extensive deep vein thrombosis throughout the left lower  extremity. No extensive thrombus into the external iliac or common  iliac veins.     SILVIA HERNANDEZ DO    VENOUS ULTRASOUND LEFT LOWER EXTREMITY  8/15/2018 2:35 PM      HISTORY: Acute deep vein thrombosis (DVT) of femoral vein of left  lower extremity (H).     COMPARISON: 7/31/2018     TECHNIQUE: Color Doppler and spectral waveform analysis performed  throughout the deep veins of the left lower extremity.     FINDINGS: The left common femoral, proximal greater saphenous, and  femoral vein segments appear patent with normal blood flow,  compression, and augmentation. Nonocclusive thrombus in the popliteal  vein. Occlusive thrombus extends into the posterior tibial veins  proximally. Both posterior tibial peroneal veins are patent in the  lower calf. Previously, thrombus was identified in the common femoral,  femoral, popliteal, and calf veins.         IMPRESSION:  1. Improvement in overall deep venous thrombus as there is  nonocclusive thrombus in the popliteal vein with extension into the  proximal posterior tibial veins. This is diminished in extent  considerably since previous exam.     RACHANA ZIMMER MD    PROCEDURE:  Venous Doppler ultrasound of the left lower extremity     DATE OF PROCEDURE:  12/13/2018 10:08 AM     CLINICAL HISTORY/INDICATION:   53-year-old female with history of left lower extremity deep venous  thrombosis currently on Cymbalta presents for follow-up.     COMPARISON:  8/15/2018     TECHNIQUE:   Grayscale, color-flow, and spectral waveform analysis were performed  of the  deep veins of the left lower extremity     FINDINGS:   The left common femoral vein, superficial femoral vein and popliteal  vein demonstrate normal compressibility, spectral waveform, color flow  and augmentation.     The left posterior tibial vein, peroneal vein, and greater saphenous  vein are compressible.     The contralateral right common femoral vein demonstrates normal  compressibility, spectral waveform, color flow and augmentation.                                                                      IMPRESSION:   No evidence of deep venous thrombosis in the left lower extremity     RUBY HAMPTON MD      A/P:  (I82.409) DVT (deep venous thrombosis) (H) extensive LLE provoked first life time episode 7/29/18  (primary encounter diagnosis) Immobilization, BCP related.  (K64.4) Internal  Hemorrhoids,  No more bleeding  Comment: This is a very pleasant 53-year-old female developed provoked first lifetime left lower extremity extensive deep venous thrombosis .  She had a history of left fibular fracture underwent open reduction internal fixation nonweightbearing she was also taking birth control pills prior to the DVT.  She was admitted to the hospital and underwent IV heparin then followed by Xarelto 15 mg twice a day for 21 days  Recent  left lower extremity venous duplex significantly improved clot burden,and today US resolved clot    Plan;  Stop xarelto starting today  Take aspirin 162 mg daily ( two baby aspirins starting tomorrow with food)  Continue compression stockings and elevate leg  Repeat D dimer in one month ( we will discuss results at that time) if negative continue aspirin only.  Written instructions given to the patient         (M84.364S) Fracture of left fibula, sequela ( s/p fall in july 12th s/p ORIF )   management per ortho    Total patient care time spent today 25 minutes face-to-face and more than 50% of the time spent counseling of the above-mentioned multiple medical issues.  Reviewed  recent hospitalization records and reviewed recent labs and imaging study with the patient today.    I have discussed with patient the risks, benefits, medications, treatment options and modalities.   I have instructed the patient to call or schedule a follow-up appointment if any problems or failure to improve.    Alma Navarrete MD,[facility]  Vascular Medicine

## 2018-12-13 NOTE — PATIENT INSTRUCTIONS
Your D dimer normal and leg ultrasound resolved clot    Stop xarelto today  , start aspirin 162 mg ( two baby aspirins with food) starting tomorrow.    Repeat D dimer in one month and if negative , continue aspirin only if positive will talk,

## 2018-12-13 NOTE — NURSING NOTE
"Louann Claire is a 53 year old female who presents for:  Chief Complaint   Patient presents with     RECHECK     follow up DVT - patient will have D-Dimer and US prior to appt         Vitals:    Vitals:    12/13/18 1031   BP: 138/84   BP Location: Right arm   Patient Position: Chair   Cuff Size: Adult Regular   Pulse: 87   SpO2: 97%   Weight: 178 lb 6.4 oz (80.9 kg)       BMI:  Estimated body mass index is 30.62 kg/m  as calculated from the following:    Height as of 7/29/18: 5' 4\" (1.626 m).    Weight as of this encounter: 178 lb 6.4 oz (80.9 kg).    Pain Score:  Data Unavailable        Beba Looney MA    "

## 2019-01-15 ENCOUNTER — HOSPITAL ENCOUNTER (OUTPATIENT)
Dept: LAB | Facility: CLINIC | Age: 54
Discharge: HOME | End: 2019-01-15
Attending: INTERNAL MEDICINE | Admitting: INTERNAL MEDICINE
Payer: COMMERCIAL

## 2019-01-15 DIAGNOSIS — I82.4Z2 DVT, LOWER EXTREMITY, DISTAL, ACUTE, LEFT (H): ICD-10-CM

## 2019-01-15 LAB — D DIMER PPP FEU-MCNC: <0.3 UG/ML FEU (ref 0–0.5)

## 2019-01-15 PROCEDURE — 36415 COLL VENOUS BLD VENIPUNCTURE: CPT | Performed by: INTERNAL MEDICINE

## 2019-01-15 PROCEDURE — 85379 FIBRIN DEGRADATION QUANT: CPT | Performed by: INTERNAL MEDICINE

## 2019-10-11 ENCOUNTER — HOSPITAL ENCOUNTER (OUTPATIENT)
Dept: MAMMOGRAPHY | Facility: CLINIC | Age: 54
Discharge: HOME | End: 2019-10-11
Attending: FAMILY MEDICINE | Admitting: FAMILY MEDICINE
Payer: COMMERCIAL

## 2019-10-11 DIAGNOSIS — Z12.31 VISIT FOR SCREENING MAMMOGRAM: ICD-10-CM

## 2019-10-11 PROCEDURE — 77063 BREAST TOMOSYNTHESIS BI: CPT

## 2019-11-05 ENCOUNTER — HEALTH MAINTENANCE LETTER (OUTPATIENT)
Age: 54
End: 2019-11-05

## 2020-11-22 ENCOUNTER — HEALTH MAINTENANCE LETTER (OUTPATIENT)
Age: 55
End: 2020-11-22

## 2021-01-15 ENCOUNTER — HOSPITAL ENCOUNTER (OUTPATIENT)
Dept: MAMMOGRAPHY | Facility: CLINIC | Age: 56
Discharge: HOME | End: 2021-01-15
Attending: FAMILY MEDICINE | Admitting: FAMILY MEDICINE
Payer: COMMERCIAL

## 2021-01-15 DIAGNOSIS — Z12.31 VISIT FOR SCREENING MAMMOGRAM: ICD-10-CM

## 2021-01-15 PROCEDURE — 77063 BREAST TOMOSYNTHESIS BI: CPT

## 2021-09-19 ENCOUNTER — HEALTH MAINTENANCE LETTER (OUTPATIENT)
Age: 56
End: 2021-09-19

## 2022-01-09 ENCOUNTER — HEALTH MAINTENANCE LETTER (OUTPATIENT)
Age: 57
End: 2022-01-09

## 2022-02-01 ENCOUNTER — HOSPITAL ENCOUNTER (OUTPATIENT)
Dept: MAMMOGRAPHY | Facility: CLINIC | Age: 57
Discharge: HOME | End: 2022-02-01
Attending: FAMILY MEDICINE | Admitting: FAMILY MEDICINE
Payer: COMMERCIAL

## 2022-02-01 DIAGNOSIS — Z12.31 VISIT FOR SCREENING MAMMOGRAM: ICD-10-CM

## 2022-02-01 PROCEDURE — 77067 SCR MAMMO BI INCL CAD: CPT

## 2022-11-21 ENCOUNTER — HEALTH MAINTENANCE LETTER (OUTPATIENT)
Age: 57
End: 2022-11-21

## 2023-02-09 ENCOUNTER — HOSPITAL ENCOUNTER (OUTPATIENT)
Dept: MAMMOGRAPHY | Facility: CLINIC | Age: 58
Discharge: HOME | End: 2023-02-09
Attending: FAMILY MEDICINE | Admitting: FAMILY MEDICINE
Payer: COMMERCIAL

## 2023-02-09 DIAGNOSIS — Z12.31 VISIT FOR SCREENING MAMMOGRAM: ICD-10-CM

## 2023-02-09 PROCEDURE — 77067 SCR MAMMO BI INCL CAD: CPT

## 2023-04-16 ENCOUNTER — HEALTH MAINTENANCE LETTER (OUTPATIENT)
Age: 58
End: 2023-04-16

## 2024-05-07 ENCOUNTER — HOSPITAL ENCOUNTER (OUTPATIENT)
Dept: MAMMOGRAPHY | Facility: CLINIC | Age: 59
Discharge: HOME | End: 2024-05-07
Attending: FAMILY MEDICINE | Admitting: FAMILY MEDICINE
Payer: COMMERCIAL

## 2024-05-07 DIAGNOSIS — Z12.31 VISIT FOR SCREENING MAMMOGRAM: ICD-10-CM

## 2024-05-07 PROCEDURE — 77063 BREAST TOMOSYNTHESIS BI: CPT

## 2024-06-22 ENCOUNTER — HEALTH MAINTENANCE LETTER (OUTPATIENT)
Age: 59
End: 2024-06-22

## 2024-10-30 ENCOUNTER — HOSPITAL ENCOUNTER (EMERGENCY)
Facility: CLINIC | Age: 59
End: 2024-10-30
Payer: COMMERCIAL

## 2025-05-09 ENCOUNTER — HOSPITAL ENCOUNTER (OUTPATIENT)
Dept: MAMMOGRAPHY | Facility: CLINIC | Age: 60
Discharge: HOME | End: 2025-05-09
Attending: FAMILY MEDICINE | Admitting: FAMILY MEDICINE
Payer: COMMERCIAL

## 2025-05-09 DIAGNOSIS — Z12.31 VISIT FOR SCREENING MAMMOGRAM: ICD-10-CM

## 2025-05-09 PROCEDURE — 77063 BREAST TOMOSYNTHESIS BI: CPT
